# Patient Record
Sex: FEMALE | Race: WHITE
[De-identification: names, ages, dates, MRNs, and addresses within clinical notes are randomized per-mention and may not be internally consistent; named-entity substitution may affect disease eponyms.]

---

## 2018-05-26 ENCOUNTER — HOSPITAL ENCOUNTER (INPATIENT)
Dept: HOSPITAL 17 - NEPC | Age: 48
LOS: 6 days | Discharge: HOME | DRG: 195 | End: 2018-06-01
Payer: COMMERCIAL

## 2018-05-26 VITALS
RESPIRATION RATE: 18 BRPM | DIASTOLIC BLOOD PRESSURE: 67 MMHG | HEART RATE: 95 BPM | SYSTOLIC BLOOD PRESSURE: 126 MMHG | OXYGEN SATURATION: 95 %

## 2018-05-26 VITALS
DIASTOLIC BLOOD PRESSURE: 65 MMHG | OXYGEN SATURATION: 95 % | RESPIRATION RATE: 18 BRPM | SYSTOLIC BLOOD PRESSURE: 123 MMHG | HEART RATE: 95 BPM

## 2018-05-26 VITALS
DIASTOLIC BLOOD PRESSURE: 70 MMHG | HEART RATE: 100 BPM | RESPIRATION RATE: 18 BRPM | OXYGEN SATURATION: 96 % | TEMPERATURE: 98.6 F | SYSTOLIC BLOOD PRESSURE: 136 MMHG

## 2018-05-26 VITALS
HEART RATE: 96 BPM | SYSTOLIC BLOOD PRESSURE: 108 MMHG | OXYGEN SATURATION: 96 % | DIASTOLIC BLOOD PRESSURE: 54 MMHG | RESPIRATION RATE: 18 BRPM | TEMPERATURE: 99.8 F

## 2018-05-26 VITALS — BODY MASS INDEX: 25.97 KG/M2 | HEIGHT: 66 IN | WEIGHT: 161.6 LBS

## 2018-05-26 VITALS — OXYGEN SATURATION: 92 %

## 2018-05-26 DIAGNOSIS — Z92.3: ICD-10-CM

## 2018-05-26 DIAGNOSIS — J18.9: Primary | ICD-10-CM

## 2018-05-26 DIAGNOSIS — C50.919: ICD-10-CM

## 2018-05-26 DIAGNOSIS — D72.829: ICD-10-CM

## 2018-05-26 LAB
BASOPHILS # BLD AUTO: 0 TH/MM3 (ref 0–0.2)
BASOPHILS NFR BLD: 0.3 % (ref 0–2)
BUN SERPL-MCNC: 6 MG/DL (ref 7–18)
CALCIUM SERPL-MCNC: 8.6 MG/DL (ref 8.5–10.1)
CHLORIDE SERPL-SCNC: 97 MEQ/L (ref 98–107)
CREAT SERPL-MCNC: 0.6 MG/DL (ref 0.5–1)
EOSINOPHIL # BLD: 0.3 TH/MM3 (ref 0–0.4)
EOSINOPHIL NFR BLD: 2.2 % (ref 0–4)
ERYTHROCYTE [DISTWIDTH] IN BLOOD BY AUTOMATED COUNT: 13.2 % (ref 11.6–17.2)
GFR SERPLBLD BASED ON 1.73 SQ M-ARVRAT: 107 ML/MIN (ref 89–?)
GLUCOSE SERPL-MCNC: 87 MG/DL (ref 74–106)
HCO3 BLD-SCNC: 29.6 MEQ/L (ref 21–32)
HCT VFR BLD CALC: 30.9 % (ref 35–46)
HGB BLD-MCNC: 10.5 GM/DL (ref 11.6–15.3)
INR PPP: 1.1 RATIO
LYMPHOCYTES # BLD AUTO: 0.7 TH/MM3 (ref 1–4.8)
LYMPHOCYTES NFR BLD AUTO: 5.8 % (ref 9–44)
MAGNESIUM SERPL-MCNC: 2.2 MG/DL (ref 1.5–2.5)
MCH RBC QN AUTO: 31.6 PG (ref 27–34)
MCHC RBC AUTO-ENTMCNC: 33.9 % (ref 32–36)
MCV RBC AUTO: 93.3 FL (ref 80–100)
MONOCYTE #: 1 TH/MM3 (ref 0–0.9)
MONOCYTES NFR BLD: 8.4 % (ref 0–8)
NEUTROPHILS # BLD AUTO: 10.1 TH/MM3 (ref 1.8–7.7)
NEUTROPHILS NFR BLD AUTO: 83.3 % (ref 16–70)
PLATELET # BLD: 574 TH/MM3 (ref 150–450)
PMV BLD AUTO: 7.2 FL (ref 7–11)
PROTHROMBIN TIME: 10.9 SEC (ref 9.8–11.6)
RBC # BLD AUTO: 3.32 MIL/MM3 (ref 4–5.3)
SODIUM SERPL-SCNC: 136 MEQ/L (ref 136–145)
TROPONIN I SERPL-MCNC: (no result) NG/ML (ref 0.02–0.05)
WBC # BLD AUTO: 12.2 TH/MM3 (ref 4–11)

## 2018-05-26 PROCEDURE — 94667 MNPJ CHEST WALL 1ST: CPT

## 2018-05-26 PROCEDURE — 87116 MYCOBACTERIA CULTURE: CPT

## 2018-05-26 PROCEDURE — 87206 SMEAR FLUORESCENT/ACID STAI: CPT

## 2018-05-26 PROCEDURE — 87040 BLOOD CULTURE FOR BACTERIA: CPT

## 2018-05-26 PROCEDURE — 94664 DEMO&/EVAL PT USE INHALER: CPT

## 2018-05-26 PROCEDURE — 71046 X-RAY EXAM CHEST 2 VIEWS: CPT

## 2018-05-26 PROCEDURE — 84484 ASSAY OF TROPONIN QUANT: CPT

## 2018-05-26 PROCEDURE — 83880 ASSAY OF NATRIURETIC PEPTIDE: CPT

## 2018-05-26 PROCEDURE — 87449 NOS EACH ORGANISM AG IA: CPT

## 2018-05-26 PROCEDURE — 85610 PROTHROMBIN TIME: CPT

## 2018-05-26 PROCEDURE — 83735 ASSAY OF MAGNESIUM: CPT

## 2018-05-26 PROCEDURE — 83605 ASSAY OF LACTIC ACID: CPT

## 2018-05-26 PROCEDURE — 94150 VITAL CAPACITY TEST: CPT

## 2018-05-26 PROCEDURE — 87015 SPECIMEN INFECT AGNT CONCNTJ: CPT

## 2018-05-26 PROCEDURE — 87804 INFLUENZA ASSAY W/OPTIC: CPT

## 2018-05-26 PROCEDURE — 94668 MNPJ CHEST WALL SBSQ: CPT

## 2018-05-26 PROCEDURE — 94640 AIRWAY INHALATION TREATMENT: CPT

## 2018-05-26 PROCEDURE — 80048 BASIC METABOLIC PNL TOTAL CA: CPT

## 2018-05-26 PROCEDURE — 99285 EMERGENCY DEPT VISIT HI MDM: CPT

## 2018-05-26 PROCEDURE — 71045 X-RAY EXAM CHEST 1 VIEW: CPT

## 2018-05-26 PROCEDURE — 85730 THROMBOPLASTIN TIME PARTIAL: CPT

## 2018-05-26 PROCEDURE — 71260 CT THORAX DX C+: CPT

## 2018-05-26 PROCEDURE — 87205 SMEAR GRAM STAIN: CPT

## 2018-05-26 PROCEDURE — 87070 CULTURE OTHR SPECIMN AEROBIC: CPT

## 2018-05-26 PROCEDURE — 93005 ELECTROCARDIOGRAM TRACING: CPT

## 2018-05-26 PROCEDURE — 85025 COMPLETE CBC W/AUTO DIFF WBC: CPT

## 2018-05-26 RX ADMIN — TAZOBACTAM SODIUM AND PIPERACILLIN SODIUM SCH MLS/HR: 375; 3 INJECTION, SOLUTION INTRAVENOUS at 17:00

## 2018-05-26 RX ADMIN — TAZOBACTAM SODIUM AND PIPERACILLIN SODIUM SCH MLS/HR: 375; 3 INJECTION, SOLUTION INTRAVENOUS at 22:50

## 2018-05-26 RX ADMIN — IPRATROPIUM BROMIDE AND ALBUTEROL SULFATE SCH AMPULE: .5; 3 SOLUTION RESPIRATORY (INHALATION) at 20:09

## 2018-05-26 RX ADMIN — Medication SCH ML: at 21:20

## 2018-05-26 NOTE — PD
HPI


Chief Complaint:  Respiratory Symptoms


Time Seen by Provider:  14:34


Travel History


International Travel<30 days:  No


Contact w/Intl Traveler<30days:  No


Traveled to known affect area:  No





History of Present Illness


HPI


49yo F with PMH of breast CA s/p radiation therapy ending in April and now on 

tamoxifen here with c/o persistent fever and worsening sob.  Pt said she had a 

CXR last week that showed pneumonia and was place on azithromycin starting 3 

days ago by her primary care physician.  Feels more sob with lying down and 

walking.  Denies any chest pain but does have right breast pain. Denies any n/v

, abdominal pain, focal weakness or numbness.  Oncologist is Dr. Cano.





[facility]


Past Medical History


Pregnant?:  Not Pregnant





Social History


Tobacco Use:  No





Allergies-Medications


(Allergen,Severity, Reaction):  


Coded Allergies:  


     apple (Unverified  Allergy, Severe, 5/26/18)


 JUICE


     lidocaine (Unverified  Allergy, Severe, 5/26/18)


 NKA


Reported Meds & Prescriptions





Reported Meds & Active Scripts


Active


Reported


Tamoxifen (Tamoxifen Citrate) 10 Mg Tab 10 Mg PO DAILY








Review of Systems


Except as stated in HPI:  all other systems reviewed are Neg





Physical Exam


Narrative


GENERAL: 49yo F in mild distress.


SKIN: Focused skin assessment warm/dry.


HEAD: Atraumatic. Normocephalic. 


EYES: Pupils equal and round. No scleral icterus. No injection or drainage. 


ENT: No nasal bleeding or discharge.  Mucous membranes pink and moist.


NECK: Trachea midline. No JVD. 


CARDIOVASCULAR: Regular rate and rhythm.  No murmur appreciated.


RESPIRATORY: No accessory muscle use. Coarse breath sounds in right lower lung.


GASTROINTESTINAL: Abdomen soft, non-tender, nondistended. 


MUSCULOSKELETAL: No obvious deformities. No clubbing.  No cyanosis.  No edema. 


NEUROLOGICAL: Awake and alert. No obvious cranial nerve deficits.  Motor 

grossly within normal limits. Normal speech.


PSYCHIATRIC: Appropriate mood and affect; insight and judgment normal.





Data


Data


Last Documented VS





Vital Signs








  Date Time  Temp Pulse Resp B/P (MAP) Pulse Ox O2 Delivery O2 Flow Rate FiO2


 


5/26/18 16:47  95 18 123/65 (84) 95 Room Air  


 


5/26/18 14:24 98.6       








Orders





 Orders


Complete Blood Count With Diff (5/26/18 14:43)


Basic Metabolic Panel (Bmp) (5/26/18 14:43)


B-Type Natriuretic Peptide (5/26/18 14:43)


Act Partial Throm Time (Ptt) (5/26/18 14:43)


Prothrombin Time / Inr (Pt) (5/26/18 14:43)


Magnesium (Mg) (5/26/18 14:43)


Troponin I (5/26/18 14:43)


Influenzae A/B Antigen (5/26/18 14:43)


Blood Culture (5/26/18 14:43)


Electrocardiogram (5/26/18 14:43)


Chest, Single Ap (5/26/18 14:43)


Lactic Acid Sepsis Protocol (5/26/18 14:43)


Levofloxacin 750 Mg Premix Inj (Levaquin (5/26/18 16:15)


Admit Order (Ed Use Only) (5/26/18 16:32)


Sodium Chloride 0.9% Flush (Ns Flush) (5/26/18 16:45)


Sodium Chloride 0.9% Flush (Ns Flush) (5/26/18 21:00)


Ondansetron Inj (Zofran Inj) (5/26/18 16:45)


Admit To Inpatient (5/26/18 )


Code Status (5/26/18 16:32)


Vital Signs (Adult) Q4H (5/26/18 16:32)


Activity Oob With Assistance PRN (5/26/18 16:32)


Notify  Parameters (5/26/18 16:32)


Intake + Output Q8H (5/26/18 16:32)


^ Smoking Cessation Counseling (5/26/18 16:32)


Diet Regular Basic (5/26/18 Dinner)


Complete Blood Count With Diff (5/27/18 06:00)


Basic Metabolic Panel (Bmp) (5/27/18 06:00)


Chest, Pa & Lat (5/27/18 08:00)


Consult Pt Eval & Treat (5/26/18 16:32)


Scd Bilateral/Knee High JOI.BID (5/26/18 16:32)


Inpatient Certification (5/26/18 )


Piperacil-Tazo 3.375 Gm Premix (Zosyn 3. (5/26/18 17:00)


Albuterol-Ipratropium Neb (Duoneb Neb) (5/26/18 20:00)


Albuterol-Ipratropium Neb (Duoneb Neb) (5/26/18 16:45)


Tamoxifen (Nolvadex) (5/27/18 09:00)





Labs





Laboratory Tests








Test


  5/26/18


14:50


 


White Blood Count 12.2 TH/MM3 


 


Red Blood Count 3.32 MIL/MM3 


 


Hemoglobin 10.5 GM/DL 


 


Hematocrit 30.9 % 


 


Mean Corpuscular Volume 93.3 FL 


 


Mean Corpuscular Hemoglobin 31.6 PG 


 


Mean Corpuscular Hemoglobin


Concent 33.9 % 


 


 


Red Cell Distribution Width 13.2 % 


 


Platelet Count 574 TH/MM3 


 


Mean Platelet Volume 7.2 FL 


 


Neutrophils (%) (Auto) 83.3 % 


 


Lymphocytes (%) (Auto) 5.8 % 


 


Monocytes (%) (Auto) 8.4 % 


 


Eosinophils (%) (Auto) 2.2 % 


 


Basophils (%) (Auto) 0.3 % 


 


Neutrophils # (Auto) 10.1 TH/MM3 


 


Lymphocytes # (Auto) 0.7 TH/MM3 


 


Monocytes # (Auto) 1.0 TH/MM3 


 


Eosinophils # (Auto) 0.3 TH/MM3 


 


Basophils # (Auto) 0.0 TH/MM3 


 


CBC Comment DIFF FINAL 


 


Differential Comment  


 


Prothrombin Time 10.9 SEC 


 


Prothromb Time International


Ratio 1.1 RATIO 


 


 


Activated Partial


Thromboplast Time 26.2 SEC 


 


 


Blood Urea Nitrogen 6 MG/DL 


 


Creatinine 0.60 MG/DL 


 


Random Glucose 87 MG/DL 


 


Calcium Level 8.6 MG/DL 


 


Magnesium Level 2.2 MG/DL 


 


Sodium Level 136 MEQ/L 


 


Potassium Level 3.7 MEQ/L 


 


Chloride Level 97 MEQ/L 


 


Carbon Dioxide Level 29.6 MEQ/L 


 


Anion Gap 9 MEQ/L 


 


Estimat Glomerular Filtration


Rate 107 ML/MIN 


 


 


Lactic Acid Level 0.8 mmol/L 


 


Troponin I


  LESS THAN 0.02


NG/ML


 


B-Type Natriuretic Peptide 68 PG/ML 











MDM


Medical Decision Making


Medical Screen Exam Complete:  Yes


Emergency Medical Condition:  Yes


Interpretation(s)


EKG: NSR 90bpm.  Normal axis.  No ST segment elevation or depression.


Differential Diagnosis


Pneumonia vs. pleural effusion vs. malignancy


Narrative Course


49yo F with breast cancer on tamoxifen here with fever, cough, sob that has 

been worsening in the last few days.  Pt has been on azithromycin but states it 

is getting worst.  Physical exam showed decreased breath sounds and coarse 

breath sound in right lower lung.  Labs reviewed, WBC 12.2.  H/H low at 10.5/

30.9.  However, her previous H/H was from 2005.  Elevated platelet count.  

Lactic acid normal.  BNP normal.  Troponin negative.  BMP unremarkable.  CXR 

showed right lower lobe pneumonia.  I have reviewed the CXR myself and felt 

that there is a large infiltrate that covers right lower and middle lobe and 

since pt is worsening with outpatient treatment, will admit pt for IV 

antibiotics.  Pt given levofloxacin IV.  Discussed with Dr. Dockery and 

accepted to his service.





Diagnosis





 Primary Impression:  


 Pneumonia


 Qualified Codes:  J18.1 - Lobar pneumonia, unspecified organism





Admitting Information


Admitting Physician Requests:  Mabel Meng DO May 26, 2018 14:46

## 2018-05-26 NOTE — RADRPT
EXAM DATE:  5/26/2018 3:16 PM EDT

AGE/SEX:        48 years / Female



INDICATIONS:  SOB. Fever one week. 



CLINICAL DATA:  This is the patient's initial encounter. Patient reports that signs and symptoms have
 been present for 1 week and indicates a pain score of 0/10. 

                                                                          

MEDICAL/SURGICAL HISTORY:       None. None.



COMPARISON:         None.



FINDINGS:  

A single AP view of the chest demonstrates a dense consolidation involving the right midlung and righ
t lower lobe. This obscures the hemidiaphragm. Left lung is clear. No discernible effusions. Heart is
 normal in size. Bony structures are unremarkable.





CONCLUSION: 

   Right lower lobe pneumonia.



Electronically signed by: Sal Hartman MD  5/26/2018 3:23 PM EDT

## 2018-05-26 NOTE — HHI.HP
HPI


Service


[facility]


Primary Care Physician


Non-Staff


Admission Diagnosis





Right lower pneumonia


Chief Complaint:  


cough and SOB


Travel History


International Travel<30 Days:  No


Contact w/Intl Traveler <30 Da:  No


Traveled to Known Affected Are:  No


History of Present Illness


This a 49yo female patient with PMH of breast CA s/p resection 2017 and 

radiation therapy ending in early April now on tamoxifen.  Pt reports that she 

had a CXR last week that showed pneumonia and was place on azithromycin which 

was starting 3 days ago by her primary care physician.  Patient reports that 

she continues to have fevers 101 -102 with chills, SOB with minimal exertion 

and cough productive of yellow phlegm.  Denies any chest pain but does have 

right breast pain. Patient denies any n/v, abdominal pain, focal weakness or 

numbness.  Patient's radiation oncologist is Dr. Stinson.  





CXR reveals right lower lobe pneumonia





Review of Systems


Constitutional:  COMPLAINS OF: Fever, Chills


Respiratory:  COMPLAINS OF: Cough, Sputum production, Shortness of breath





Past Family Social History


Past Medical History


breast CA s/p resection and radiation therapy ending in early April now on 

tamoxifen


Past Surgical History


Resection right breast Ca 2017


cholecystectomy


tubal ligation


R shoulder orthoscopic surgery


Reported Medications





Tamoxifen (Tamoxifen Citrate) 10 Mg Tab 10 Mg PO DAILY


Azithromycin started 3 days ago


Allergies:  


Coded Allergies:  


     apple (Unverified  Allergy, Severe, 18)


 JUICE


     lidocaine (Unverified  Allergy, Severe, 18)


 NKA


Family History


noncontributory


Social History


ETOH use: 2-3 beers per day


denies tobacco use or illicit drug use





Physical Exam


Vital Signs





Vital Signs








  Date Time  Temp Pulse Resp B/P (MAP) Pulse Ox O2 Delivery O2 Flow Rate FiO2


 


18 16:47  95 18 123/65 (84) 95 Room Air  


 


18 15:20  96 18  98 Room Air  


 


18 15:20  95 18 126/67 (86) 95 Room Air  


 


18 14:24 98.6 100 18 136/70 (92) 96   








Physical Exam


GENERAL: This is a well-nourished, well-developed patient, in no apparent 

distress.


SKIN: No rashes, ecchymoses or lesions. Cool and dry.


HEAD: Atraumatic. Normocephalic. No temporal or scalp tenderness.


EYES: Extraocular motions intact. No scleral icterus. No injection or drainage. 


CARDIOVASCULAR: Regular rate and rhythm


RESPIRATORY: left lung fields clear to auscultation.  Right lung fields coarse 

rhonci with expiratory wheezes and crackles


GASTROINTESTINAL: Abdomen soft, non-tender, nondistended. 


MUSCULOSKELETAL: Extremities without clubbing, cyanosis, or edema. No joint 

tenderness, effusion, or edema noted. No calf tenderness. Negative Homans sign 

bilaterally.


NEUROLOGICAL: Awake and alert. Cranial nerves II through XII intact.  Motor and 

sensory grossly within normal limits. Five out of 5 muscle strength in all 

muscle groups.  Normal speech.


Laboratory





Laboratory Tests








Test


  18


14:50


 


White Blood Count 12.2 


 


Red Blood Count 3.32 


 


Hemoglobin 10.5 


 


Hematocrit 30.9 


 


Mean Corpuscular Volume 93.3 


 


Mean Corpuscular Hemoglobin 31.6 


 


Mean Corpuscular Hemoglobin


Concent 33.9 


 


 


Red Cell Distribution Width 13.2 


 


Platelet Count 574 


 


Mean Platelet Volume 7.2 


 


Neutrophils (%) (Auto) 83.3 


 


Lymphocytes (%) (Auto) 5.8 


 


Monocytes (%) (Auto) 8.4 


 


Eosinophils (%) (Auto) 2.2 


 


Basophils (%) (Auto) 0.3 


 


Neutrophils # (Auto) 10.1 


 


Lymphocytes # (Auto) 0.7 


 


Monocytes # (Auto) 1.0 


 


Eosinophils # (Auto) 0.3 


 


Basophils # (Auto) 0.0 


 


CBC Comment DIFF FINAL 


 


Differential Comment  


 


Prothrombin Time 10.9 


 


Prothromb Time International


Ratio 1.1 


 


 


Activated Partial


Thromboplast Time 26.2 


 


 


Blood Urea Nitrogen 6 


 


Creatinine 0.60 


 


Random Glucose 87 


 


Calcium Level 8.6 


 


Magnesium Level 2.2 


 


Sodium Level 136 


 


Potassium Level 3.7 


 


Chloride Level 97 


 


Carbon Dioxide Level 29.6 


 


Anion Gap 9 


 


Estimat Glomerular Filtration


Rate 107 


 


 


Lactic Acid Level 0.8 


 


Troponin I LESS THAN 0.02 


 


B-Type Natriuretic Peptide 68 














 Date/Time


Source Procedure


Growth Status


 


 


 18 14:55


Blood Peripheral Aerobic Blood Culture


Pending Received


 


 18 14:55


Blood Peripheral Anaerobic Blood Culture


Pending Received


 


 18 15:16


Nasal Aspirate Influenza Types A,B Antigen (DOMINIQUE) - Final


NEGATIVE FOR FLU A AND B ANTIGEN.... Complete








Result Diagram:  


18 1450                                                                   

             18 1450





Imaging





Last Impressions








Chest X-Ray 18 1443 Signed





Impressions: 





 CONCLUSION: 





    Right lower lobe pneumonia.





  





 











Caprini VTE Risk Assessment


Caprini VTE Risk Assessment:  No/Low Risk (score <= 1)


Caprini Risk Assessment Model











 Point Value = 1          Point Value = 2  Point Value = 3  Point Value = 5


 


Age 41-60


Minor surgery


BMI > 25 kg/m2


Swollen legs


Varicose veins


Pregnancy or postpartum


History of unexplained or recurrent


   spontaneous 


Oral contraceptives or hormone


   replacement


Sepsis (< 1 month)


Serious lung disease, including


   pneumonia (< 1 month)


Abnormal pulmonary function


Acute myocardial infarction


Congestive heart failure (< 1 month)


History of inflammatory bowel disease


Medical patient at bed rest Age 61-74


Arthroscopic surgery


Major open surgery (> 45 min)


Laparoscopic surgery (> 45 min)


Malignancy


Confined to bed (> 72 hours)


Immobilizing plaster cast


Central venous access Age >= 75


History of VTE


Family history of VTE


Factor V Leiden


Prothrombin 88556E


Lupus anticoagulant


Anticardiolipin antibodies


Elevated serum homocysteine


Heparin-induced thrombocytopenia


Other congenital or acquired


   thrombophilia Stroke (< 1 month)


Elective arthroplasty


Hip, pelvis, or leg fracture


Acute spinal cord injury (< 1 month)








Prophylaxis Regimen











   Total Risk


Factor Score Risk Level Prophylaxis Regimen


 


0-1      Low Early ambulation


 


2 Moderate Order ONE of the following:


*Sequential Compression Device (SCD)


*Heparin 5000 units SQ BID


 


3-4 Higher Order ONE of the following medications:


*Heparin 5000 units SQ TID


*Enoxaparin/Lovenox 40 mg SQ daily (WT < 150 kg, CrCl > 30 mL/min)


*Enoxaparin/Lovenox 30 mg SQ daily (WT < 150 kg, CrCl > 10-29 mL/min)


*Enoxaparin/Lovenox 30 mg SQ BID (WT < 150 kg, CrCl > 30 mL/min)


AND/OR


*Sequential Compression Device (SCD)


 


5 or more Highest Order ONE of the following medications:


*Heparin 5000 units SQ TID (Preferred with Epidurals)


*Enoxaparin/Lovenox 40 mg SQ daily (WT < 150 kg, CrCl > 30 mL/min)


*Enoxaparin/Lovenox 30 mg SQ daily (WT < 150 kg, CrCl > 10-29 mL/min)


*Enoxaparin/Lovenox 30 mg SQ BID (WT < 150 kg, CrCl > 30 mL/min)


AND


*Sequential Compression Device (SCD)











Assessment and Plan


Problem List:  


(1) Pneumonia


ICD Codes:  J18.9 - Pneumonia, unspecified organism


Status:  Acute


Plan:  This a 49yo female patient with PMH of breast CA s/p resection 2017 

and radiation therapy ending in early April now on tamoxifen.  Pt reports that 

she had a CXR last week that showed pneumonia and was place on azithromycin 

which was starting 3 days ago by her primary care physician.  Patient reports 

that she continues to have fevers 101 -102 while chills, SOB with minimal 

exertion and cough productive of yellow phlegm.  Denies any chest pain but does 

have right breast pain. Patient denies any n/v, abdominal pain, focal weakness 

or numbness.  Patient's radiation oncologist is Dr. Stinson.  





- CXR reveals right lower lobe pneumonia


- started Zosyn


- repeat CXR in AM


- duonebs Q6H while awake and PRN





DVT prophylaxis with SCDs





(2) Breast CA


ICD Codes:  C50.919 - Malignant neoplasm of unspecified site of unspecified 

female breast


Plan:  This a 49yo female patient with PMH of breast CA s/p resection 2017 

and radiation therapy ending in early April now on tamoxifen.  Patient's 

radiation oncologist is Dr. Stinson.  





- continue patient's home tamoxifen





Assessment and Plan


Patient examined.


Assessment and plan formulated with Gracie Costa PA-C.


I agree with the above.





Physician Certification


2 Midnight Certification Type:  Admission for Inpatient Services


Order for Inpatient Services


The services are ordered in accordance with Medicare regulations or non-

Medicare payer requirements, as applicable.  In the case of services not 

specified as inpatient-only, they are appropriately provided as inpatient 

services in accordance with the 2-midnight benchmark.


Estimated LOS (days):  3


 days is the estimated time the patient will need to remain in the hospital, 

assuming treatment plan goals are met and no additional complications.


Post-Hospital Plan:  Home





Problem Qualifiers





(1) Pneumonia:  


Qualified Codes:  J18.1 - Lobar pneumonia, unspecified organism








Gracie Costa May 26, 2018 16:49


Vimal Dockery DO May 27, 2018 23:31

## 2018-05-27 VITALS
TEMPERATURE: 98.5 F | SYSTOLIC BLOOD PRESSURE: 121 MMHG | HEART RATE: 98 BPM | RESPIRATION RATE: 21 BRPM | DIASTOLIC BLOOD PRESSURE: 67 MMHG | OXYGEN SATURATION: 95 %

## 2018-05-27 VITALS — OXYGEN SATURATION: 94 %

## 2018-05-27 VITALS
SYSTOLIC BLOOD PRESSURE: 115 MMHG | HEART RATE: 95 BPM | TEMPERATURE: 100.8 F | RESPIRATION RATE: 18 BRPM | OXYGEN SATURATION: 95 % | DIASTOLIC BLOOD PRESSURE: 57 MMHG

## 2018-05-27 VITALS
OXYGEN SATURATION: 94 % | HEART RATE: 86 BPM | SYSTOLIC BLOOD PRESSURE: 109 MMHG | RESPIRATION RATE: 18 BRPM | TEMPERATURE: 98.5 F | DIASTOLIC BLOOD PRESSURE: 56 MMHG

## 2018-05-27 VITALS
DIASTOLIC BLOOD PRESSURE: 51 MMHG | HEART RATE: 93 BPM | SYSTOLIC BLOOD PRESSURE: 98 MMHG | OXYGEN SATURATION: 95 % | RESPIRATION RATE: 20 BRPM | TEMPERATURE: 99 F

## 2018-05-27 VITALS
OXYGEN SATURATION: 93 % | DIASTOLIC BLOOD PRESSURE: 57 MMHG | HEART RATE: 96 BPM | TEMPERATURE: 101 F | SYSTOLIC BLOOD PRESSURE: 109 MMHG | RESPIRATION RATE: 20 BRPM

## 2018-05-27 VITALS
TEMPERATURE: 98.5 F | OXYGEN SATURATION: 95 % | SYSTOLIC BLOOD PRESSURE: 112 MMHG | DIASTOLIC BLOOD PRESSURE: 56 MMHG | HEART RATE: 85 BPM | RESPIRATION RATE: 19 BRPM

## 2018-05-27 VITALS — TEMPERATURE: 101.7 F

## 2018-05-27 VITALS — TEMPERATURE: 99.7 F

## 2018-05-27 LAB
BASOPHILS # BLD AUTO: 0.1 TH/MM3 (ref 0–0.2)
BASOPHILS NFR BLD: 0.5 % (ref 0–2)
BUN SERPL-MCNC: 6 MG/DL (ref 7–18)
CALCIUM SERPL-MCNC: 8.5 MG/DL (ref 8.5–10.1)
CHLORIDE SERPL-SCNC: 99 MEQ/L (ref 98–107)
CREAT SERPL-MCNC: 0.63 MG/DL (ref 0.5–1)
EOSINOPHIL # BLD: 0.3 TH/MM3 (ref 0–0.4)
EOSINOPHIL NFR BLD: 2.6 % (ref 0–4)
ERYTHROCYTE [DISTWIDTH] IN BLOOD BY AUTOMATED COUNT: 13.7 % (ref 11.6–17.2)
GFR SERPLBLD BASED ON 1.73 SQ M-ARVRAT: 101 ML/MIN (ref 89–?)
GLUCOSE SERPL-MCNC: 91 MG/DL (ref 74–106)
HCO3 BLD-SCNC: 26.8 MEQ/L (ref 21–32)
HCT VFR BLD CALC: 30.7 % (ref 35–46)
HGB BLD-MCNC: 10.3 GM/DL (ref 11.6–15.3)
LYMPHOCYTES # BLD AUTO: 0.6 TH/MM3 (ref 1–4.8)
LYMPHOCYTES NFR BLD AUTO: 4.3 % (ref 9–44)
MCH RBC QN AUTO: 31.4 PG (ref 27–34)
MCHC RBC AUTO-ENTMCNC: 33.7 % (ref 32–36)
MCV RBC AUTO: 93.3 FL (ref 80–100)
MONOCYTE #: 1 TH/MM3 (ref 0–0.9)
MONOCYTES NFR BLD: 7.6 % (ref 0–8)
NEUTROPHILS # BLD AUTO: 11.3 TH/MM3 (ref 1.8–7.7)
NEUTROPHILS NFR BLD AUTO: 85 % (ref 16–70)
PLATELET # BLD: 548 TH/MM3 (ref 150–450)
PMV BLD AUTO: 7.6 FL (ref 7–11)
RBC # BLD AUTO: 3.29 MIL/MM3 (ref 4–5.3)
SODIUM SERPL-SCNC: 136 MEQ/L (ref 136–145)
WBC # BLD AUTO: 13.3 TH/MM3 (ref 4–11)

## 2018-05-27 RX ADMIN — IPRATROPIUM BROMIDE AND ALBUTEROL SULFATE SCH AMPULE: .5; 3 SOLUTION RESPIRATORY (INHALATION) at 13:55

## 2018-05-27 RX ADMIN — TAZOBACTAM SODIUM AND PIPERACILLIN SODIUM SCH MLS/HR: 375; 3 INJECTION, SOLUTION INTRAVENOUS at 23:03

## 2018-05-27 RX ADMIN — IPRATROPIUM BROMIDE AND ALBUTEROL SULFATE SCH AMPULE: .5; 3 SOLUTION RESPIRATORY (INHALATION) at 08:51

## 2018-05-27 RX ADMIN — TAZOBACTAM SODIUM AND PIPERACILLIN SODIUM SCH MLS/HR: 375; 3 INJECTION, SOLUTION INTRAVENOUS at 04:08

## 2018-05-27 RX ADMIN — ACETAMINOPHEN PRN MG: 325 TABLET ORAL at 04:09

## 2018-05-27 RX ADMIN — TAZOBACTAM SODIUM AND PIPERACILLIN SODIUM SCH MLS/HR: 375; 3 INJECTION, SOLUTION INTRAVENOUS at 12:00

## 2018-05-27 RX ADMIN — IPRATROPIUM BROMIDE AND ALBUTEROL SULFATE SCH AMPULE: .5; 3 SOLUTION RESPIRATORY (INHALATION) at 20:25

## 2018-05-27 RX ADMIN — TAMOXIFEN CITRATE SCH MG: 10 TABLET, FILM COATED ORAL at 12:00

## 2018-05-27 RX ADMIN — Medication SCH ML: at 09:00

## 2018-05-27 RX ADMIN — Medication SCH ML: at 20:32

## 2018-05-27 RX ADMIN — ACETAMINOPHEN PRN MG: 325 TABLET ORAL at 14:22

## 2018-05-27 RX ADMIN — TAMOXIFEN CITRATE SCH MG: 10 TABLET, FILM COATED ORAL at 08:55

## 2018-05-27 RX ADMIN — TAZOBACTAM SODIUM AND PIPERACILLIN SODIUM SCH MLS/HR: 375; 3 INJECTION, SOLUTION INTRAVENOUS at 17:52

## 2018-05-27 NOTE — RADRPT
EXAM DATE:  5/27/2018 7:55 AM EDT

AGE/SEX:        48 years / Female



INDICATIONS:  Right lower lobe pneumonia.



CLINICAL DATA:  This is the patient's subsequent encounter. Patient reports that signs and symptoms h
ave been present for 1 week and indicates a pain score of 0/10. 

                                                                          

MEDICAL/SURGICAL HISTORY:       None. None.



[place]      [facility] [place].  .



FINDINGS:  

There continues to be diffuse interstitial and airspace infiltrate throughout most of the right lung.
 This is stable compared to the prior exam. The left lung remains clear and well aerated. No new pare
nchymal infiltrates are demonstrated. The heart size is stable. The bony structures are stable.



CONCLUSION: 

No significant change in the diffuse patchy infiltrate throughout the right lung compared to the prio
r study. The findings would suggest right pneumonia.



Electronically signed by: Buzz Palacio MD  5/27/2018 7:59 AM EDT

## 2018-05-27 NOTE — EKG
Date Performed: 05/26/2018       Time Performed: 14:43:53

 

PTAGE:      48 years

 

EKG:      Sinus rhythm 

 

 WITHIN NORMAL LIMITS BORDERLINE ECG 

 

NO PREVIOUS TRACING            

 

DOCTOR:   Leonardo Lassiter  Interpretating Date/Time  05/27/2018 14:26:04

## 2018-05-27 NOTE — HHI.PR
Subjective


Remarks


Patient reports feeling better today, less SOB





Objective


Vitals





Vital Signs








  Date Time  Temp Pulse Resp B/P (MAP) Pulse Ox O2 Delivery O2 Flow Rate FiO2


 


5/27/18 14:00 101.7       


 


5/27/18 12:00 98.5 98 21 121/67 (85) 95   


 


5/27/18 08:53     94   21


 


5/27/18 08:00 98.5 85 19 112/56 (74) 95   


 


5/27/18 08:00     95 Room Air  


 


5/27/18 04:44   18     


 


5/27/18 04:00 101.0 96 20 109/57 (74) 93   


 


5/27/18 00:39 100.8 95 18 115/57 (76) 95   


 


5/26/18 23:36      Room Air  


 


5/26/18 20:36 99.8 96 18 108/54 (72) 96   


 


5/26/18 20:09     92   21


 


5/26/18 16:47  95 18 123/65 (84) 95 Room Air  














 5/27/18 5/27/18 5/28/18





 15:00 23:00 07:00


 


Intake Total 170 ml  


 


Balance 170 ml  


 


   


 


Intake Oral 120 ml  


 


IV Total 50 ml  








Result Diagram:  


5/27/18 0433                                                                   

             5/27/18 0433





Other Results





 Laboratory Tests








Test


  5/26/18


14:50 5/27/18


04:33 5/27/18


06:26


 


White Blood Count 12.2 TH/MM3  13.3 TH/MM3  


 


Red Blood Count 3.32 MIL/MM3  3.29 MIL/MM3  


 


Hemoglobin 10.5 GM/DL  10.3 GM/DL  


 


Hematocrit 30.9 %  30.7 %  


 


Mean Corpuscular Volume 93.3 FL  93.3 FL  


 


Mean Corpuscular Hemoglobin 31.6 PG  31.4 PG  


 


Mean Corpuscular Hemoglobin


Concent 33.9 % 


  33.7 % 


  


 


 


Red Cell Distribution Width 13.2 %  13.7 %  


 


Platelet Count 574 TH/MM3  548 TH/MM3  


 


Mean Platelet Volume 7.2 FL  7.6 FL  


 


Neutrophils (%) (Auto) 83.3 %  85.0 %  


 


Lymphocytes (%) (Auto) 5.8 %  4.3 %  


 


Monocytes (%) (Auto) 8.4 %  7.6 %  


 


Eosinophils (%) (Auto) 2.2 %  2.6 %  


 


Basophils (%) (Auto) 0.3 %  0.5 %  


 


Neutrophils # (Auto) 10.1 TH/MM3  11.3 TH/MM3  


 


Lymphocytes # (Auto) 0.7 TH/MM3  0.6 TH/MM3  


 


Monocytes # (Auto) 1.0 TH/MM3  1.0 TH/MM3  


 


Eosinophils # (Auto) 0.3 TH/MM3  0.3 TH/MM3  


 


Basophils # (Auto) 0.0 TH/MM3  0.1 TH/MM3  


 


CBC Comment DIFF FINAL  DIFF FINAL  


 


Differential Comment     


 


Prothrombin Time 10.9 SEC   


 


Prothromb Time International


Ratio 1.1 RATIO 


  


  


 


 


Activated Partial


Thromboplast Time 26.2 SEC 


  


  


 


 


Blood Urea Nitrogen 6 MG/DL  6 MG/DL  


 


Creatinine 0.60 MG/DL  0.63 MG/DL  


 


Random Glucose 87 MG/DL  91 MG/DL  


 


Calcium Level 8.6 MG/DL  8.5 MG/DL  


 


Magnesium Level 2.2 MG/DL   


 


Sodium Level 136 MEQ/L  136 MEQ/L  


 


Potassium Level 3.7 MEQ/L  3.9 MEQ/L  


 


Chloride Level 97 MEQ/L  99 MEQ/L  


 


Carbon Dioxide Level 29.6 MEQ/L  26.8 MEQ/L  


 


Anion Gap 9 MEQ/L  10 MEQ/L  


 


Estimat Glomerular Filtration


Rate 107 ML/MIN 


  101 ML/MIN 


  


 


 


Lactic Acid Level 0.8 mmol/L   1.0 mmol/L 


 


Troponin I


  LESS THAN 0.02


NG/ML 


  


 


 


B-Type Natriuretic Peptide 68 PG/ML   








Imaging





Last Impressions








Chest X-Ray 5/26/18 1443 Signed





Impressions: 





 CONCLUSION: 





    Right lower lobe pneumonia.





  





 








Objective Remarks


GENERAL: This is a well-nourished, well-developed patient, in no apparent 

distress.


CARDIOVASCULAR: Regular rate and rhythm 


RESPIRATORY: diminished RLL with coarse crackles


GASTROINTESTINAL: Abdomen soft, non-tender, nondistended. Normal active bowel 

sounds


MUSCULOSKELETAL: Extremities without clubbing, cyanosis, or edema.


NEURO:  Alert & Oriented x4 to person, place, time, situation.  Moves all ext x4





A/P


Problem List:  


(1) Pneumonia


ICD Codes:  J18.9 - Pneumonia, unspecified organism


Status:  Acute


Plan:  This a 47yo female patient with PMH of breast CA s/p resection 11/2017 

and radiation therapy ending in early April now on tamoxifen.  Pt reports that 

she had a CXR last week that showed pneumonia and was place on azithromycin 

which was starting 3 days ago by her primary care physician.  Patient reports 

that she continues to have fevers 101 -102 while chills, SOB with minimal 

exertion and cough productive of yellow phlegm.  Denies any chest pain but does 

have right breast pain. Patient denies any n/v, abdominal pain, focal weakness 

or numbness.  Patient's radiation oncologist is Dr. Stinson.  





- CXR reveals right lower lobe pneumonia


- continue Zosyn


- repeat CXR (5/27) No significant change in the diffuse patchy infiltrate 

throughout the right lung compared to the prior study.  The findings would 

suggest right pneumonia.


- duonebs Q6H while awake and PRN


- repeat CBC in AM


-repeat CXR in AM


- add IS





DVT prophylaxis with SCDs





(2) Breast CA


ICD Codes:  C50.919 - Malignant neoplasm of unspecified site of unspecified 

female breast


Plan:  This a 47yo female patient with PMH of breast CA s/p resection 11/2017 

and radiation therapy ending in early April now on tamoxifen.  Patient's 

radiation oncologist is Dr. Stinson.  





- continue patient's home tamoxifen





Assessment and Plan


Patient examined.


Assessment and plan formulated with Gracie Costa PA-C.


I agree with the above.





Obtain Ct chest with contrast. 


continue IV zosyn





Problem Qualifiers





(1) Pneumonia:  


Qualified Codes:  J18.1 - Lobar pneumonia, unspecified organism








Gracie Costa May 27, 2018 15:44


Vimal Dockery DO May 27, 2018 23:34

## 2018-05-28 VITALS
TEMPERATURE: 97.8 F | RESPIRATION RATE: 18 BRPM | HEART RATE: 97 BPM | DIASTOLIC BLOOD PRESSURE: 55 MMHG | OXYGEN SATURATION: 95 % | SYSTOLIC BLOOD PRESSURE: 115 MMHG

## 2018-05-28 VITALS
OXYGEN SATURATION: 94 % | RESPIRATION RATE: 20 BRPM | DIASTOLIC BLOOD PRESSURE: 57 MMHG | HEART RATE: 99 BPM | SYSTOLIC BLOOD PRESSURE: 114 MMHG | TEMPERATURE: 100.6 F

## 2018-05-28 VITALS
DIASTOLIC BLOOD PRESSURE: 54 MMHG | HEART RATE: 93 BPM | SYSTOLIC BLOOD PRESSURE: 112 MMHG | TEMPERATURE: 98.5 F | OXYGEN SATURATION: 95 % | RESPIRATION RATE: 18 BRPM

## 2018-05-28 VITALS
OXYGEN SATURATION: 94 % | DIASTOLIC BLOOD PRESSURE: 56 MMHG | RESPIRATION RATE: 17 BRPM | HEART RATE: 101 BPM | TEMPERATURE: 98.1 F | SYSTOLIC BLOOD PRESSURE: 112 MMHG

## 2018-05-28 VITALS
DIASTOLIC BLOOD PRESSURE: 57 MMHG | RESPIRATION RATE: 18 BRPM | HEART RATE: 90 BPM | TEMPERATURE: 99.4 F | OXYGEN SATURATION: 95 % | SYSTOLIC BLOOD PRESSURE: 106 MMHG

## 2018-05-28 VITALS — OXYGEN SATURATION: 93 %

## 2018-05-28 VITALS
HEART RATE: 73 BPM | OXYGEN SATURATION: 95 % | RESPIRATION RATE: 16 BRPM | DIASTOLIC BLOOD PRESSURE: 57 MMHG | TEMPERATURE: 97.7 F | SYSTOLIC BLOOD PRESSURE: 107 MMHG

## 2018-05-28 VITALS — OXYGEN SATURATION: 94 %

## 2018-05-28 LAB
BASOPHILS # BLD AUTO: 0.1 TH/MM3 (ref 0–0.2)
BASOPHILS NFR BLD: 0.6 % (ref 0–2)
EOSINOPHIL # BLD: 0.4 TH/MM3 (ref 0–0.4)
EOSINOPHIL NFR BLD: 3.1 % (ref 0–4)
ERYTHROCYTE [DISTWIDTH] IN BLOOD BY AUTOMATED COUNT: 13.4 % (ref 11.6–17.2)
HCT VFR BLD CALC: 31.4 % (ref 35–46)
HGB BLD-MCNC: 10.5 GM/DL (ref 11.6–15.3)
LYMPHOCYTES # BLD AUTO: 0.8 TH/MM3 (ref 1–4.8)
LYMPHOCYTES NFR BLD AUTO: 6.1 % (ref 9–44)
MCH RBC QN AUTO: 31.4 PG (ref 27–34)
MCHC RBC AUTO-ENTMCNC: 33.5 % (ref 32–36)
MCV RBC AUTO: 93.8 FL (ref 80–100)
MONOCYTE #: 0.8 TH/MM3 (ref 0–0.9)
MONOCYTES NFR BLD: 5.6 % (ref 0–8)
NEUTROPHILS # BLD AUTO: 11.6 TH/MM3 (ref 1.8–7.7)
NEUTROPHILS NFR BLD AUTO: 84.6 % (ref 16–70)
PLATELET # BLD: 561 TH/MM3 (ref 150–450)
PMV BLD AUTO: 7.7 FL (ref 7–11)
RBC # BLD AUTO: 3.35 MIL/MM3 (ref 4–5.3)
WBC # BLD AUTO: 13.7 TH/MM3 (ref 4–11)

## 2018-05-28 RX ADMIN — TAZOBACTAM SODIUM AND PIPERACILLIN SODIUM SCH MLS/HR: 375; 3 INJECTION, SOLUTION INTRAVENOUS at 05:42

## 2018-05-28 RX ADMIN — ACETAMINOPHEN PRN MG: 325 TABLET ORAL at 00:36

## 2018-05-28 RX ADMIN — TAZOBACTAM SODIUM AND PIPERACILLIN SODIUM SCH MLS/HR: 375; 3 INJECTION, SOLUTION INTRAVENOUS at 10:17

## 2018-05-28 RX ADMIN — IPRATROPIUM BROMIDE AND ALBUTEROL SULFATE SCH AMPULE: .5; 3 SOLUTION RESPIRATORY (INHALATION) at 20:57

## 2018-05-28 RX ADMIN — GUAIFENESIN SCH MG: 600 TABLET, EXTENDED RELEASE ORAL at 21:36

## 2018-05-28 RX ADMIN — Medication SCH ML: at 21:37

## 2018-05-28 RX ADMIN — AZITHROMYCIN SCH MLS/HR: 500 INJECTION, POWDER, LYOPHILIZED, FOR SOLUTION INTRAVENOUS at 12:33

## 2018-05-28 RX ADMIN — IPRATROPIUM BROMIDE AND ALBUTEROL SULFATE SCH AMPULE: .5; 3 SOLUTION RESPIRATORY (INHALATION) at 08:00

## 2018-05-28 RX ADMIN — TAMOXIFEN CITRATE SCH MG: 10 TABLET, FILM COATED ORAL at 10:16

## 2018-05-28 RX ADMIN — TAZOBACTAM SODIUM AND PIPERACILLIN SODIUM SCH MLS/HR: 375; 3 INJECTION, SOLUTION INTRAVENOUS at 17:40

## 2018-05-28 RX ADMIN — TAZOBACTAM SODIUM AND PIPERACILLIN SODIUM SCH MLS/HR: 375; 3 INJECTION, SOLUTION INTRAVENOUS at 23:28

## 2018-05-28 RX ADMIN — Medication SCH ML: at 10:17

## 2018-05-28 RX ADMIN — IPRATROPIUM BROMIDE AND ALBUTEROL SULFATE SCH AMPULE: .5; 3 SOLUTION RESPIRATORY (INHALATION) at 12:56

## 2018-05-28 NOTE — RADRPT
EXAM DATE:  5/28/2018 1:55 PM EDT

AGE/SEX:        48 years / Female



INDICATIONS:  Pneumonia.  History of breast cancer, rule out metastasis. 



CLINICAL DATA:  This is the patient's initial encounter. Patient reports that signs and symptoms have
 been present for 1 week and indicates a pain score of 0/10. 

                                                                          

MEDICAL/SURGICAL HISTORY:   Carcinoma, breast. None.



RADIATION DOSE:  9.30 CTDI (mGy)









COMPARISON:      No prior [place] exams available for comparison.



TECHNIQUE:  Multiple contiguous axial images were obtained through the chest during bolus infusion of
 75 ml Omnipaque 350 (iohexol)  nonionic water-soluble contrast as a single exam dose.   Images were 
obtained in suspended respiration using multiple row detector helical technique.  Using automated exp
osure control and adjustment of the mA and/or kV according to patient size, radiation dose was kept a
s low as reasonably achievable to obtain optimal diagnostic quality images.



FINDINGS: 

Lungs:  Multi lobar consolidation throughout the right upper, right lower and to lesser degree right 
middle and lingula. Air bronchograms. A few scattered subcentimeter nodules in the right upper lobe m
easuring 4 to 8 mm in size.

Mediastinum:  There is good visualization of the great vessels of the middle mediastinum.  No evidenc
e of mediastinal or hilar adenopathy/mass.

Pleurae:  No evidence of focal thickening or pleural effusion.

Axillae:  Unremarkable.

Bony Structures:  Unremarkable.

Miscellaneous:  The examination was extended to include the upper abdomen, and both adrenal glands ar
e normal in size and configuration.



CONCLUSION:

1.  Multilobar consolidation likely multilobar pneumonia greater in the right lung. Treatment and fol
low-up to resolution recommended.

2.  A few scattered subcentimeter nodules of uncertain significance but could be part of the same inf
ectious process. Follow-up CT chest in 3 months recommended for stability.



Electronically signed by: Shreyas Crespo MD  5/28/2018 2:01 PM EDT

## 2018-05-28 NOTE — HHI.PR
Subjective


Remarks


Patient reports feeling about the same as yesterday


Continues to have shortness of breath with minimal exertion


24-hour T-max 100.6





Objective


Vitals





Vital Signs








  Date Time  Temp Pulse Resp B/P (MAP) Pulse Ox O2 Delivery O2 Flow Rate FiO2


 


5/28/18 08:02     93   


 


5/28/18 08:00 98.1 101 17 112/56 (74) 94   


 


5/28/18 07:40      Room Air  


 


5/28/18 04:00 97.7 73 16 107/57 (74) 95   


 


5/28/18 00:00 100.6 99 20 114/57 (76) 94   


 


5/27/18 20:25     94   21


 


5/27/18 20:00 98.5 86 18 109/56 (73) 94   


 


5/27/18 20:00     94 Room Air  


 


5/27/18 17:00 99.7       


 


5/27/18 16:00 99.0 93 20 98/51 (67) 95   


 


5/27/18 14:00 101.7       


 


5/27/18 12:00 98.5 98 21 121/67 (85) 95   








Result Diagram:  


5/28/18 0530                                                                   

             5/27/18 0433





Imaging





Last Impressions








Chest X-Ray 5/26/18 1443 Signed





Impressions: 





 CONCLUSION: 





    Right lower lobe pneumonia.





  





 








Objective Remarks


GENERAL: This is a well-nourished, well-developed patient, in no apparent 

distress.


CARDIOVASCULAR: Regular rate and rhythm 


RESPIRATORY: diminished RLL with coarse crackles-more air movement noted than 

yesterday


GASTROINTESTINAL: Abdomen soft, non-tender, nondistended. Normal active bowel 

sounds


MUSCULOSKELETAL: Extremities without clubbing, cyanosis, or edema.


NEURO:  Alert & Oriented x4 to person, place, time, situation.  Moves all ext x4





A/P


Problem List:  


(1) Pneumonia


ICD Codes:  J18.9 - Pneumonia, unspecified organism


Status:  Acute


Plan:  Pneumonia


This a 47yo female patient with PMH of breast CA s/p resection 11/2017 and 

radiation therapy ending in early April now on tamoxifen.  Pt reports that she 

had a CXR last week that showed pneumonia and was place on azithromycin which 

was starting 3 days ago by her primary care physician.  Patient reports that 

she continues to have fevers 101 -102 while chills, SOB with minimal exertion 

and cough productive of yellow phlegm.  Denies any chest pain but does have 

right breast pain. Patient denies any n/v, abdominal pain, focal weakness or 

numbness.  Patient's radiation oncologist is Dr. Stinson.  





- CXR reveals right lower lobe pneumonia


- continue Zosyn


- repeat CXR (5/27) No significant change in the diffuse patchy infiltrate 

throughout the right lung compared to the prior study.  The findings would 

suggest right pneumonia.


- duonebs Q6H while awake and PRN


- repeat CBC in AM


-repeat CXR in AM


- IS


-CT chest/thorax with IV contrast pending


Repeat CBC and BMP in a.m.





DVT prophylaxis with SCDs





 Breast CA


 This a 47yo female patient with PMH of breast CA s/p resection 11/2017 and 

radiation therapy ending in early April now on tamoxifen.  Patient's radiation 

oncologist is Dr. Stinson.  





- continue patient's home tamoxifen





(2) Breast CA


ICD Codes:  C50.919 - Malignant neoplasm of unspecified site of unspecified 

female breast


Assessment and Plan


Patient examined.


Assessment and plan formulated with Gracie Costa PA-C.


I agree with the above.


CAP. Breast CA. r/o underlying mets to lung.


add atypical abx coverage


add mucinex. cont nebs.


ngtd on blood cx. inc spirometers.





Problem Qualifiers





(1) Pneumonia:  


Qualified Codes:  J18.1 - Lobar pneumonia, unspecified organism








Gracie Costa May 28, 2018 11:04


Leonardo Laureano MD May 28, 2018 21:32

## 2018-05-29 VITALS
TEMPERATURE: 100 F | SYSTOLIC BLOOD PRESSURE: 101 MMHG | RESPIRATION RATE: 17 BRPM | DIASTOLIC BLOOD PRESSURE: 59 MMHG | OXYGEN SATURATION: 93 % | HEART RATE: 86 BPM

## 2018-05-29 VITALS
SYSTOLIC BLOOD PRESSURE: 111 MMHG | DIASTOLIC BLOOD PRESSURE: 60 MMHG | RESPIRATION RATE: 18 BRPM | HEART RATE: 90 BPM | TEMPERATURE: 98.4 F | OXYGEN SATURATION: 96 %

## 2018-05-29 VITALS
SYSTOLIC BLOOD PRESSURE: 112 MMHG | HEART RATE: 91 BPM | DIASTOLIC BLOOD PRESSURE: 57 MMHG | OXYGEN SATURATION: 97 % | TEMPERATURE: 97.3 F | RESPIRATION RATE: 18 BRPM

## 2018-05-29 VITALS
TEMPERATURE: 98.6 F | OXYGEN SATURATION: 93 % | DIASTOLIC BLOOD PRESSURE: 57 MMHG | RESPIRATION RATE: 18 BRPM | SYSTOLIC BLOOD PRESSURE: 111 MMHG | HEART RATE: 87 BPM

## 2018-05-29 VITALS
DIASTOLIC BLOOD PRESSURE: 62 MMHG | SYSTOLIC BLOOD PRESSURE: 109 MMHG | OXYGEN SATURATION: 97 % | TEMPERATURE: 99 F | HEART RATE: 86 BPM | RESPIRATION RATE: 18 BRPM

## 2018-05-29 VITALS
RESPIRATION RATE: 18 BRPM | HEART RATE: 88 BPM | SYSTOLIC BLOOD PRESSURE: 109 MMHG | TEMPERATURE: 98.7 F | OXYGEN SATURATION: 94 % | DIASTOLIC BLOOD PRESSURE: 54 MMHG

## 2018-05-29 VITALS — TEMPERATURE: 100.4 F

## 2018-05-29 VITALS — OXYGEN SATURATION: 100 %

## 2018-05-29 LAB
BASOPHILS # BLD AUTO: 0.1 TH/MM3 (ref 0–0.2)
BASOPHILS NFR BLD: 0.5 % (ref 0–2)
EOSINOPHIL # BLD: 0.3 TH/MM3 (ref 0–0.4)
EOSINOPHIL NFR BLD: 2.6 % (ref 0–4)
ERYTHROCYTE [DISTWIDTH] IN BLOOD BY AUTOMATED COUNT: 13.9 % (ref 11.6–17.2)
HCT VFR BLD CALC: 29.4 % (ref 35–46)
HGB BLD-MCNC: 9.9 GM/DL (ref 11.6–15.3)
LYMPHOCYTES # BLD AUTO: 0.5 TH/MM3 (ref 1–4.8)
LYMPHOCYTES NFR BLD AUTO: 4 % (ref 9–44)
MCH RBC QN AUTO: 31.4 PG (ref 27–34)
MCHC RBC AUTO-ENTMCNC: 33.8 % (ref 32–36)
MCV RBC AUTO: 93 FL (ref 80–100)
MONOCYTE #: 0.9 TH/MM3 (ref 0–0.9)
MONOCYTES NFR BLD: 6.9 % (ref 0–8)
NEUTROPHILS # BLD AUTO: 10.9 TH/MM3 (ref 1.8–7.7)
NEUTROPHILS NFR BLD AUTO: 86 % (ref 16–70)
PLATELET # BLD: 494 TH/MM3 (ref 150–450)
PMV BLD AUTO: 7.6 FL (ref 7–11)
RBC # BLD AUTO: 3.16 MIL/MM3 (ref 4–5.3)
WBC # BLD AUTO: 12.7 TH/MM3 (ref 4–11)

## 2018-05-29 RX ADMIN — Medication SCH ML: at 08:37

## 2018-05-29 RX ADMIN — ACETYLCYSTEINE SCH ML: 200 SOLUTION ORAL; RESPIRATORY (INHALATION) at 13:09

## 2018-05-29 RX ADMIN — AZITHROMYCIN SCH MLS/HR: 500 INJECTION, POWDER, LYOPHILIZED, FOR SOLUTION INTRAVENOUS at 12:23

## 2018-05-29 RX ADMIN — IPRATROPIUM BROMIDE AND ALBUTEROL SULFATE SCH AMPULE: .5; 3 SOLUTION RESPIRATORY (INHALATION) at 20:16

## 2018-05-29 RX ADMIN — ACETYLCYSTEINE SCH ML: 200 SOLUTION ORAL; RESPIRATORY (INHALATION) at 20:16

## 2018-05-29 RX ADMIN — ACETAMINOPHEN PRN MG: 325 TABLET ORAL at 13:21

## 2018-05-29 RX ADMIN — GUAIFENESIN SCH MG: 600 TABLET, EXTENDED RELEASE ORAL at 19:56

## 2018-05-29 RX ADMIN — IPRATROPIUM BROMIDE AND ALBUTEROL SULFATE SCH AMPULE: .5; 3 SOLUTION RESPIRATORY (INHALATION) at 13:09

## 2018-05-29 RX ADMIN — TAZOBACTAM SODIUM AND PIPERACILLIN SODIUM SCH MLS/HR: 375; 3 INJECTION, SOLUTION INTRAVENOUS at 22:31

## 2018-05-29 RX ADMIN — TAZOBACTAM SODIUM AND PIPERACILLIN SODIUM SCH MLS/HR: 375; 3 INJECTION, SOLUTION INTRAVENOUS at 05:29

## 2018-05-29 RX ADMIN — TAZOBACTAM SODIUM AND PIPERACILLIN SODIUM SCH MLS/HR: 375; 3 INJECTION, SOLUTION INTRAVENOUS at 11:12

## 2018-05-29 RX ADMIN — Medication SCH ML: at 19:56

## 2018-05-29 RX ADMIN — GUAIFENESIN SCH MG: 600 TABLET, EXTENDED RELEASE ORAL at 08:35

## 2018-05-29 RX ADMIN — TAMOXIFEN CITRATE SCH MG: 10 TABLET, FILM COATED ORAL at 08:34

## 2018-05-29 RX ADMIN — IPRATROPIUM BROMIDE AND ALBUTEROL SULFATE SCH AMPULE: .5; 3 SOLUTION RESPIRATORY (INHALATION) at 08:27

## 2018-05-29 RX ADMIN — TAZOBACTAM SODIUM AND PIPERACILLIN SODIUM SCH MLS/HR: 375; 3 INJECTION, SOLUTION INTRAVENOUS at 16:53

## 2018-05-29 NOTE — MB
cc:

Luis Carlos Roche MD, Arjun D MD

****

 

 

DATE:

05/29/2018

 

 REQUESTING PHYSICIAN:

Dr. Laureano

 

REASON FOR CONSULTATION:

Evaluation for pneumonia.

 

HISTORY OF PRESENT ILLNESS:

Ms. Trammell is a pleasant 48-year-old female with history of CA of the 

breast.  She had a lumpectomy done and then she received 36 radiation 

treatments, now she is on tamoxifen.  The patient started having fever

about 2 weeks ago.  She took some Tylenol, did not get better, [facility] 

[facility] and [facility] started on Z-[place].  She continued 

to have fever and [facility] and was told that 

she had pneumonia.  She was given another antibiotic, did not get 

better.  Because of continued fever, she came to the hospital.  She 

had a CT scan of the chest done, which shows multilobar consolidation,

likely multilobar pneumonia in the right lung, a few scattered 

subcentimeter nodules of uncertain significance.

 

LABORATORY DATA:

Her CBC showed WBC count 12.7, hemoglobin 9.9, hematocrit 29.4, MCV 

93, platelet count 494.  Sodium 136, potassium 3.9, chloride 99, CO2 

of 26, BUN 6, creatinine 0.63.  INR 1.1.  Her blood cultures so far 

are negative.  Influenza antigen is negative.  Legionella and 

pneumococcal antigen are negative.

 

PAST MEDICAL HISTORY:

Significant for history of breast cancer status post lumpectomy, 

radiation treatment.  She is on tamoxifen.

 

CURRENT MEDICATIONS:

1.  Mucomyst nebulizer treatment,

2.  Mucinex

3.  Zithromax 500 mg a day.

4.  Tamoxifen 10 mg a day.

5.  Zosyn q. 6 hrs

6.  Albuterol and Atrovent nebulizer treatment.

 

ALLERGIES:

ALLERGIC TO SULFA AND LIDOCAINE.

 

SOCIAL HISTORY:

She is recently .  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

 No history of any smoking, drinks socially.  No drug abuse.

 

FAMILY HISTORY:

She has 3 daughters; has 3 brothers and 2 sisters, 1 brother has 

depression problem.

 

REVIEW OF SYSTEMS:.

Normally she is healthy.  No weight loss, no DVT or pulmonary 

embolism.  No seizure or stroke.

 

PHYSICAL EXAMINATION:

GENERAL:  Well-built, well-nourished female not in acute distress.

VITAL SIGNS:  Blood pressure 112/57, heart rate 91, respirations 18, 

temperature 97.4.

HEENT:  Pupils are equal and reactive to light.  Oral mucosa and nasal

mucosa normal.

NECK:  Supple.  JVP not raised.

CHEST:  Equal bilaterally.  She has bronchial breath sounds in the 

right lung.

CARDIOVASCULAR:  S1, S2 normal.

ABDOMEN:  Benign.

EXTREMITIES:  No edema.

 

IMPRESSION:

1.  Multilobar pneumonia.  Clinically, she is responding to treatment.

2.  Carcinoma of the breast.

3.  Mild leukocytosis.

 

PLAN:

We will maintain on antibiotics, Zosyn and Zithromax.  Monitor her 

cultures.  Encourage her to use Acapella to see if she can produce any

sputum.  She will need a repeat CT scan of the chest in 3-4 weeks to 

document complete clearing of the lung infiltrate.  Further 

recommendations will depend on the course in the hospital.

 

Thank you, Dr. Laureano, for this consult.

 

 

__________________________________

MD LESLIE Cain//baldemar

D: 05/29/2018, 07:14 PM

T: 05/29/2018, 07:36 PM

Visit #: [id number]

Job #: [id number]

TRAY

## 2018-05-29 NOTE — HHI.PR
Subjective


Remarks


pt with some sob with exertion.


mucinex starting to break up congestion.





Objective


Vitals


heart reg


lung crackles. egophany RLL


 few course left lung bs


abd s/nt


ext no edema


Vital Signs








  Date Time  Temp Pulse Resp B/P (MAP) Pulse Ox O2 Delivery O2 Flow Rate FiO2


 


5/29/18 08:00 98.6 87 18 111/57 (75) 93   


 


5/29/18 04:00 100.0 86 17 101/59 (73) 93   


 


5/29/18 00:01 98.4 90 18 111/60 (77) 96   


 


5/28/18 21:01     94   


 


5/28/18 20:00 99.4 90 18 106/57 (73) 95   


 


5/28/18 16:00 97.8 97 18 115/55 (75) 95   


 


5/28/18 12:00 98.5 93 18 112/54 (73) 95   








Result Diagram:  


5/29/18 0501                                                                   

             5/27/18 0433





Imaging





Last Impressions








Chest X-Ray 5/26/18 1443 Signed





Impressions: 





 CONCLUSION: 





    Right lower lobe pneumonia.





  





 











A/P


Problem List:  


(1) Pneumonia


ICD Codes:  J18.9 - Pneumonia, unspecified organism


Status:  Acute


Plan:  Pneumonia


This a 47yo female patient with PMH of breast CA s/p resection 11/2017 and 

radiation therapy ending in early April now on tamoxifen.  Pt reports that she 

had a CXR last week that showed pneumonia and was place on azithromycin which 

was starting 3 days ago by her primary care physician.  Patient reports that 

she continues to have fevers 101 -102 while chills, SOB with minimal exertion 

and cough productive of yellow phlegm.  Denies any chest pain but does have 

right breast pain. Patient denies any n/v, abdominal pain, focal weakness or 

numbness.  Patient's radiation oncologist is Dr. Stinson.  





- CXR reveals right lower lobe pneumonia


- CT chest 5/28:


1.  Multilobar consolidation likely multilobar pneumonia greater in the right 

lung. Treatment and follow-up to resolution recommended.


2.  A few scattered subcentimeter nodules of uncertain significance but could 

be part of the same infectious process. Follow-up CT chest in 3 months 

recommended for stability.





_ Pt is on zosyn and azithromycin. will likely d/c home on levaquin


-- continue nebs and add mucomyst....gs/cx sputum if productive


-- increase ambulation


-- cont incentive spirometer


-- She will need repeat chest imaging in next 3months. I discussed with pt/




-- We will establish her with a Pulmonologist. She was scheduled to see Dr Roche.





DVT prophylaxis with SCDs





 Breast CA


 This a 47yo female patient with PMH of breast CA s/p resection 11/2017 and 

radiation therapy ending in early April now on tamoxifen.  Patient's radiation 

oncologist is Dr. Stinson.  





- continue patient's home tamoxifen





(2) Breast CA


ICD Codes:  C50.919 - Malignant neoplasm of unspecified site of unspecified 

female breast





Problem Qualifiers





(1) Pneumonia:  


Qualified Codes:  J18.1 - Lobar pneumonia, unspecified organism








Leonardo Laureano MD May 29, 2018 10:14

## 2018-05-30 VITALS
RESPIRATION RATE: 18 BRPM | OXYGEN SATURATION: 93 % | DIASTOLIC BLOOD PRESSURE: 55 MMHG | HEART RATE: 99 BPM | TEMPERATURE: 98.5 F | SYSTOLIC BLOOD PRESSURE: 112 MMHG

## 2018-05-30 VITALS
OXYGEN SATURATION: 96 % | DIASTOLIC BLOOD PRESSURE: 58 MMHG | RESPIRATION RATE: 18 BRPM | SYSTOLIC BLOOD PRESSURE: 108 MMHG | HEART RATE: 90 BPM | TEMPERATURE: 99 F

## 2018-05-30 VITALS
SYSTOLIC BLOOD PRESSURE: 108 MMHG | DIASTOLIC BLOOD PRESSURE: 62 MMHG | OXYGEN SATURATION: 97 % | RESPIRATION RATE: 18 BRPM | TEMPERATURE: 99.4 F | HEART RATE: 94 BPM

## 2018-05-30 VITALS
TEMPERATURE: 99 F | OXYGEN SATURATION: 92 % | SYSTOLIC BLOOD PRESSURE: 109 MMHG | RESPIRATION RATE: 18 BRPM | HEART RATE: 91 BPM | DIASTOLIC BLOOD PRESSURE: 57 MMHG

## 2018-05-30 VITALS
TEMPERATURE: 99.1 F | SYSTOLIC BLOOD PRESSURE: 111 MMHG | HEART RATE: 94 BPM | RESPIRATION RATE: 17 BRPM | DIASTOLIC BLOOD PRESSURE: 56 MMHG | OXYGEN SATURATION: 94 %

## 2018-05-30 VITALS
RESPIRATION RATE: 18 BRPM | TEMPERATURE: 98.8 F | SYSTOLIC BLOOD PRESSURE: 105 MMHG | HEART RATE: 86 BPM | OXYGEN SATURATION: 95 % | DIASTOLIC BLOOD PRESSURE: 59 MMHG

## 2018-05-30 VITALS — OXYGEN SATURATION: 94 %

## 2018-05-30 RX ADMIN — GUAIFENESIN SCH MG: 600 TABLET, EXTENDED RELEASE ORAL at 19:44

## 2018-05-30 RX ADMIN — TAZOBACTAM SODIUM AND PIPERACILLIN SODIUM SCH MLS/HR: 375; 3 INJECTION, SOLUTION INTRAVENOUS at 22:12

## 2018-05-30 RX ADMIN — GUAIFENESIN SCH MG: 600 TABLET, EXTENDED RELEASE ORAL at 08:27

## 2018-05-30 RX ADMIN — TAZOBACTAM SODIUM AND PIPERACILLIN SODIUM SCH MLS/HR: 375; 3 INJECTION, SOLUTION INTRAVENOUS at 11:04

## 2018-05-30 RX ADMIN — Medication SCH ML: at 19:45

## 2018-05-30 RX ADMIN — Medication SCH ML: at 08:29

## 2018-05-30 RX ADMIN — ACETYLCYSTEINE SCH ML: 200 SOLUTION ORAL; RESPIRATORY (INHALATION) at 13:12

## 2018-05-30 RX ADMIN — IPRATROPIUM BROMIDE AND ALBUTEROL SULFATE SCH AMPULE: .5; 3 SOLUTION RESPIRATORY (INHALATION) at 07:56

## 2018-05-30 RX ADMIN — IPRATROPIUM BROMIDE AND ALBUTEROL SULFATE SCH AMPULE: .5; 3 SOLUTION RESPIRATORY (INHALATION) at 19:20

## 2018-05-30 RX ADMIN — TAZOBACTAM SODIUM AND PIPERACILLIN SODIUM SCH MLS/HR: 375; 3 INJECTION, SOLUTION INTRAVENOUS at 04:40

## 2018-05-30 RX ADMIN — ACETYLCYSTEINE SCH ML: 200 SOLUTION ORAL; RESPIRATORY (INHALATION) at 07:57

## 2018-05-30 RX ADMIN — TAMOXIFEN CITRATE SCH MG: 10 TABLET, FILM COATED ORAL at 08:27

## 2018-05-30 RX ADMIN — TAZOBACTAM SODIUM AND PIPERACILLIN SODIUM SCH MLS/HR: 375; 3 INJECTION, SOLUTION INTRAVENOUS at 15:59

## 2018-05-30 RX ADMIN — AZITHROMYCIN SCH MLS/HR: 500 INJECTION, POWDER, LYOPHILIZED, FOR SOLUTION INTRAVENOUS at 11:56

## 2018-05-30 RX ADMIN — ACETYLCYSTEINE SCH ML: 200 SOLUTION ORAL; RESPIRATORY (INHALATION) at 19:20

## 2018-05-30 RX ADMIN — IPRATROPIUM BROMIDE AND ALBUTEROL SULFATE SCH AMPULE: .5; 3 SOLUTION RESPIRATORY (INHALATION) at 13:12

## 2018-05-30 NOTE — PD.PN.STU
Subjective


Remarks


Ms. Trammell is feeling better today. Her temp was 99 this am.  She feels that 

the mucinex nebulizer is improving her breathing. Yesterday she walked around 

the hallways and felt short of breath. She plans to walk today as well. Last 

night she developed an itchy rash behind her left knee and on her med-upper 

back. She has been using topical Benadryl to help with the itching.





Objective


Vitals





Vital Signs








  Date Time  Temp Pulse Resp B/P (MAP) Pulse Ox O2 Delivery O2 Flow Rate FiO2


 


5/30/18 08:44      Room Air  


 


5/30/18 08:00 99.1 94 17 111/56 (74) 94   


 


5/30/18 04:10 99.0 91 18 109/57 (74) 92   


 


5/30/18 00:21 98.8 86 18 105/59 (74) 95   


 


5/29/18 20:21     100   


 


5/29/18 19:50 99.0 86 18 109/62 (78) 97   


 


5/29/18 16:00 97.3 91 18 112/57 (75) 97   


 


5/29/18 13:21 100.4       


 


5/29/18 12:00 98.7 88 18 109/54 (72) 94   














I/O      


 


 5/29/18 5/29/18 5/29/18 5/30/18 5/30/18 5/30/18





 07:00 15:00 23:00 07:00 15:00 23:00


 


Intake Total 700 ml   420 ml  


 


Balance 700 ml   420 ml  


 


      


 


Intake Oral 600 ml   420 ml  


 


IV Total 100 ml     


 


# Voids 2  6 3  


 


# Bowel Movements   2 2  








Result Diagram:  


5/29/18 0501                                                                   

             5/27/18 0433





Objective Remarks


NAD


Lung - clear equal breath sounds. No wheezing or rhonchi. No use of accessory 

muscles.


Skin - macular rash behind left knee. One small bump under left scapula that is 

red mostly likely from itching





A/P


Assessment and Plan


1.  Multilobar pneumonia


   Responding to TX


   Pending sputum cultures


   Continue breathing treatments, nebulized Mucinex, and incentive spirometry 


   Switch to PO Levaquin


   Tylenol PRN for fevers 


   Seen by pulm, will f/u in 3-4 weeks for repeat CT  


   Possible D/C today or tomorrow 





2.  Carcinoma of the breast.











Marian Rendon May 30, 2018 10:59

## 2018-05-30 NOTE — HHI.PR
Subjective


Remarks


48 YOWF with Bilat pn, ca breast


Feels better


No Fever


Minimal sp


No CP


No SOB





Objective


Vital Signs





Vital Signs








  Date Time  Temp Pulse Resp B/P (MAP) Pulse Ox O2 Delivery O2 Flow Rate FiO2


 


5/30/18 19:20     94   21


 


5/30/18 16:30 99.0 90 18 108/58 (75) 96   


 


5/30/18 16:00      Room Air  


 


5/30/18 11:13      Room Air  


 


5/30/18 11:07 99.4 94 18 108/62 (77) 97   


 


5/30/18 08:44      Room Air  


 


5/30/18 08:00 99.1 94 17 111/56 (74) 94   


 


5/30/18 04:10 99.0 91 18 109/57 (74) 92   


 


5/30/18 00:21 98.8 86 18 105/59 (74) 95   














I/O      


 


 5/29/18 5/29/18 5/29/18 5/30/18 5/30/18 5/30/18





 07:00 15:00 23:00 07:00 15:00 23:00


 


Intake Total 700 ml   420 ml 300 ml 2440 ml


 


Balance 700 ml   420 ml 300 ml 2440 ml


 


      


 


Intake Oral 600 ml   420 ml  2440 ml


 


IV Total 100 ml    300 ml 


 


# Voids 2  6 3  8


 


# Bowel Movements   2 2  1








Result Diagram:  


5/29/18 0501                                                                   

             5/27/18 0433





Objective Remarks


GENERAL: WBWN WF,NAD


SKIN: Warm and dry.


HEAD: Normocephalic.


EYES: No scleral icterus. No injection or drainage. 


NECK: Supple, trachea midline. No JVD or lymphadenopathy.


CARDIOVASCULAR: Regular rate and rhythm without murmurs, gallops, or rubs. 


RESPIRATORY: Breath sounds equal bilaterally. No accessory muscle use.


Bronchial BS right lung


GASTROINTESTINAL: Abdomen soft, non-tender, nondistended. 


MUSCULOSKELETAL: No cyanosis, or edema. 


BACK: Nontender without obvious deformity. No CVA tenderness.








A/P


Assessment and Plan


IMPRESSION:


1.  Multilobar pneumonia.  Clinically, she is responding to treatment.


2.  Carcinoma of the breast.


3.  Mild leukocytosis.


 





PLAN:





Cont Zosyn and zithro


Check Cultures


Acapella q 1hr


Rpt CXR in Luis Carlos Remy MD May 30, 2018 20:30

## 2018-05-30 NOTE — HHI.PR
Subjective


Remarks


doing better





Objective


Vitals


heart reg


lung few crackles right base


abd s/nt


ext no edema


Vital Signs








  Date Time  Temp Pulse Resp B/P (MAP) Pulse Ox O2 Delivery O2 Flow Rate FiO2


 


5/30/18 11:13      Room Air  


 


5/30/18 11:07 99.4 94 18 108/62 (77) 97   


 


5/30/18 08:44      Room Air  


 


5/30/18 08:00 99.1 94 17 111/56 (74) 94   


 


5/30/18 04:10 99.0 91 18 109/57 (74) 92   


 


5/30/18 00:21 98.8 86 18 105/59 (74) 95   


 


5/29/18 20:21     100   


 


5/29/18 19:50 99.0 86 18 109/62 (78) 97   


 


5/29/18 16:00 97.3 91 18 112/57 (75) 97   








Result Diagram:  


5/29/18 0501                                                                   

             5/27/18 0433





Imaging





Last Impressions








Chest X-Ray 5/26/18 1443 Signed





Impressions: 





 CONCLUSION: 





    Right lower lobe pneumonia.





  





 











A/P


Problem List:  


(1) Pneumonia


ICD Codes:  J18.9 - Pneumonia, unspecified organism


Status:  Acute


Plan:  Pneumonia


This a 47yo female patient with PMH of breast CA s/p resection 11/2017 and 

radiation therapy ending in early April now on tamoxifen.  Pt reports that she 

had a CXR last week that showed pneumonia and was place on azithromycin which 

was starting 3 days ago by her primary care physician.  Patient reports that 

she continues to have fevers 101 -102 while chills, SOB with minimal exertion 

and cough productive of yellow phlegm.  Denies any chest pain but does have 

right breast pain. Patient denies any n/v, abdominal pain, focal weakness or 

numbness.  Patient's radiation oncologist is Dr. Stinson.  





- CXR reveals right lower lobe pneumonia


- CT chest 5/28:


1.  Multilobar consolidation likely multilobar pneumonia greater in the right 

lung. Treatment and follow-up to resolution recommended.


2.  A few scattered subcentimeter nodules of uncertain significance but could 

be part of the same infectious process. Follow-up CT chest in 3 months 

recommended for stability.





_ Pt is on zosyn and azithromycin. will likely d/c home on levaquin


-- continue nebs and add mucomyst....gs/cx sputum


-- increase ambulation


-- cont incentive spirometer


-- She will need repeat chest imaging in next 3months. I discussed with pt/




-- We will establish her with a Pulmonologist. She was scheduled to see Dr Roche.





plan for d/c tomorrow on po abx and close f/u





DVT prophylaxis with SCDs





 Breast CA


 This a 47yo female patient with PMH of breast CA s/p resection 11/2017 and 

radiation therapy ending in early April now on tamoxifen.  Patient's radiation 

oncologist is Dr. Stinson.  





- continue patient's home tamoxifen





(2) Breast CA


ICD Codes:  C50.919 - Malignant neoplasm of unspecified site of unspecified 

female breast





Problem Qualifiers





(1) Pneumonia:  


Qualified Codes:  J18.1 - Lobar pneumonia, unspecified organism








Leonardo Laureano MD May 30, 2018 14:59

## 2018-05-31 VITALS
HEART RATE: 95 BPM | TEMPERATURE: 98.4 F | SYSTOLIC BLOOD PRESSURE: 107 MMHG | OXYGEN SATURATION: 95 % | DIASTOLIC BLOOD PRESSURE: 55 MMHG | RESPIRATION RATE: 18 BRPM

## 2018-05-31 VITALS
SYSTOLIC BLOOD PRESSURE: 99 MMHG | TEMPERATURE: 98.2 F | RESPIRATION RATE: 18 BRPM | OXYGEN SATURATION: 95 % | HEART RATE: 87 BPM | DIASTOLIC BLOOD PRESSURE: 59 MMHG

## 2018-05-31 VITALS — OXYGEN SATURATION: 93 %

## 2018-05-31 VITALS
HEART RATE: 93 BPM | DIASTOLIC BLOOD PRESSURE: 57 MMHG | RESPIRATION RATE: 16 BRPM | OXYGEN SATURATION: 96 % | TEMPERATURE: 98.3 F | SYSTOLIC BLOOD PRESSURE: 119 MMHG

## 2018-05-31 VITALS
OXYGEN SATURATION: 94 % | SYSTOLIC BLOOD PRESSURE: 125 MMHG | RESPIRATION RATE: 17 BRPM | DIASTOLIC BLOOD PRESSURE: 62 MMHG | HEART RATE: 99 BPM | TEMPERATURE: 97.9 F

## 2018-05-31 VITALS
HEART RATE: 93 BPM | RESPIRATION RATE: 18 BRPM | DIASTOLIC BLOOD PRESSURE: 60 MMHG | TEMPERATURE: 99.1 F | SYSTOLIC BLOOD PRESSURE: 108 MMHG | OXYGEN SATURATION: 90 %

## 2018-05-31 VITALS
OXYGEN SATURATION: 99 % | SYSTOLIC BLOOD PRESSURE: 119 MMHG | HEART RATE: 92 BPM | TEMPERATURE: 98.3 F | DIASTOLIC BLOOD PRESSURE: 57 MMHG | RESPIRATION RATE: 16 BRPM

## 2018-05-31 VITALS
TEMPERATURE: 98.3 F | RESPIRATION RATE: 16 BRPM | HEART RATE: 91 BPM | SYSTOLIC BLOOD PRESSURE: 116 MMHG | OXYGEN SATURATION: 95 % | DIASTOLIC BLOOD PRESSURE: 57 MMHG

## 2018-05-31 VITALS — OXYGEN SATURATION: 99 %

## 2018-05-31 RX ADMIN — IPRATROPIUM BROMIDE AND ALBUTEROL SULFATE PRN AMPULE: .5; 3 SOLUTION RESPIRATORY (INHALATION) at 16:52

## 2018-05-31 RX ADMIN — ACETYLCYSTEINE SCH ML: 200 SOLUTION ORAL; RESPIRATORY (INHALATION) at 20:45

## 2018-05-31 RX ADMIN — IPRATROPIUM BROMIDE AND ALBUTEROL SULFATE PRN AMPULE: .5; 3 SOLUTION RESPIRATORY (INHALATION) at 14:27

## 2018-05-31 RX ADMIN — TAZOBACTAM SODIUM AND PIPERACILLIN SODIUM SCH MLS/HR: 375; 3 INJECTION, SOLUTION INTRAVENOUS at 05:13

## 2018-05-31 RX ADMIN — TAMOXIFEN CITRATE SCH MG: 10 TABLET, FILM COATED ORAL at 07:41

## 2018-05-31 RX ADMIN — GUAIFENESIN SCH MG: 600 TABLET, EXTENDED RELEASE ORAL at 19:35

## 2018-05-31 RX ADMIN — Medication SCH ML: at 19:35

## 2018-05-31 RX ADMIN — ACETYLCYSTEINE SCH ML: 200 SOLUTION ORAL; RESPIRATORY (INHALATION) at 08:00

## 2018-05-31 RX ADMIN — IPRATROPIUM BROMIDE AND ALBUTEROL SULFATE PRN AMPULE: .5; 3 SOLUTION RESPIRATORY (INHALATION) at 08:36

## 2018-05-31 RX ADMIN — ACETYLCYSTEINE SCH ML: 200 SOLUTION ORAL; RESPIRATORY (INHALATION) at 14:00

## 2018-05-31 RX ADMIN — Medication SCH ML: at 07:42

## 2018-05-31 RX ADMIN — GUAIFENESIN SCH MG: 600 TABLET, EXTENDED RELEASE ORAL at 07:42

## 2018-05-31 RX ADMIN — IPRATROPIUM BROMIDE AND ALBUTEROL SULFATE PRN AMPULE: .5; 3 SOLUTION RESPIRATORY (INHALATION) at 20:45

## 2018-05-31 NOTE — RADRPT
EXAM DATE:  5/31/2018 6:57 AM EDT

AGE/SEX:        48 years / Female



INDICATIONS:  Cough, fever, short of breath, burning in middle of chest



CLINICAL DATA:  This is the patient's subsequent encounter. Patient reports that signs and symptoms h
ave been present for 4 - 6 days and indicates a pain score of 0/10. 

                                                                          

MEDICAL/SURGICAL HISTORY:       Carcinoma, breast. pneumonia  . lumpectomy



[place]      [facility] [place]  .



FINDINGS:  

There is worsening right lung consolidation with extensive right upper lung. Left lung infiltrate is 
present in mid lung field not present previously.



CONCLUSION: 

Worsening right lung consolidation and interval development of left lung infiltrate.



Electronically signed by: KIRILL Ndiaye MD  5/31/2018 7:01 AM EDT

## 2018-05-31 NOTE — HHI.PR
Subjective


Remarks


48 YOWF with Bilat pn, ca breast


Feels better


No Fever


Minimal sp


No CP


No SOB


CXR slight worsening of lung infilt


walks 10 rounds in the hallway





Objective


Vital Signs





Vital Signs








  Date Time  Temp Pulse Resp B/P (MAP) Pulse Ox O2 Delivery O2 Flow Rate FiO2


 


5/31/18 16:21     94 Room Air  


 


5/31/18 16:21 97.9 99 17 125/62 (83) 94   


 


5/31/18 12:00 98.3 93 16 119/57 (77) 96   


 


5/31/18 11:36      Room Air  


 


5/31/18 11:34 98.3 92 16 119/57 (77) 99   


 


5/31/18 08:39     99   21


 


5/31/18 08:00 98.3 91 16 116/57 (76) 95   


 


5/31/18 07:44      Room Air  


 


5/31/18 04:15 99.1 93 18 108/60 (76) 90   


 


5/31/18 00:20 98.2 87 18 99/59 (72) 95   














I/O      


 


 5/30/18 5/30/18 5/30/18 5/31/18 5/31/18 5/31/18





 07:00 15:00 23:00 07:00 15:00 23:00


 


Intake Total 420 ml 300 ml 2490 ml 700 ml  


 


Balance 420 ml 300 ml 2490 ml 700 ml  


 


      


 


Intake Oral 420 ml  2440 ml 650 ml  


 


IV Total  300 ml 50 ml 50 ml  


 


# Voids 3  8 3  


 


# Bowel Movements 2  1   








Result Diagram:  


5/29/18 0501                                                                   

             5/27/18 0433





Objective Remarks


GENERAL: WBWN WF,NAD


SKIN: Warm and dry.


HEAD: Normocephalic.


EYES: No scleral icterus. No injection or drainage. 


NECK: Supple, trachea midline. No JVD or lymphadenopathy.


CARDIOVASCULAR: Regular rate and rhythm without murmurs, gallops, or rubs. 


RESPIRATORY: Breath sounds equal bilaterally. No accessory muscle use.


Bronchial BS right lung


GASTROINTESTINAL: Abdomen soft, non-tender, nondistended. 


MUSCULOSKELETAL: No cyanosis, or edema. 


BACK: Nontender without obvious deformity. No CVA tenderness.








A/P


Assessment and Plan


IMPRESSION:


1.  Multilobar pneumonia.  Clinically, she is responding to treatment.


2.  Carcinoma of the breast.


3.  Mild leukocytosis.


 





PLAN:


PO Levaquin


Acapella q 1hr


Incentive spirometry


Stable on RA


DC plans for home











Luis Carlos Roche MD May 31, 2018 19:56

## 2018-05-31 NOTE — HHI.PR
Subjective


Remarks


hoping to go home





Objective


Vitals


heart reg


lung crackles right


abd s/nt


ext no edema


Vital Signs








  Date Time  Temp Pulse Resp B/P (MAP) Pulse Ox O2 Delivery O2 Flow Rate FiO2


 


5/31/18 11:36      Room Air  


 


5/31/18 11:34 98.3 92 16 119/57 (77) 99   


 


5/31/18 08:39     99   21


 


5/31/18 08:00 98.3 91 16 116/57 (76) 95   


 


5/31/18 07:44      Room Air  


 


5/31/18 04:15 99.1 93 18 108/60 (76) 90   


 


5/31/18 00:20 98.2 87 18 99/59 (72) 95   


 


5/30/18 19:26 98.5 99 18 112/55 (74) 93   


 


5/30/18 19:20     94   21


 


5/30/18 16:30 99.0 90 18 108/58 (75) 96   


 


5/30/18 16:00      Room Air  








Result Diagram:  


5/29/18 0501                                                                   

             5/27/18 0433





Imaging





Last Impressions








Chest X-Ray 5/26/18 1443 Signed





Impressions: 





 CONCLUSION: 





    Right lower lobe pneumonia.





  





 











A/P


Problem List:  


(1) Pneumonia


ICD Codes:  J18.9 - Pneumonia, unspecified organism


Status:  Acute


Plan:  Pneumonia


This a 47yo female patient with PMH of breast CA s/p resection 11/2017 and 

radiation therapy ending in early April now on tamoxifen.  Pt reports that she 

had a CXR last week that showed pneumonia and was place on azithromycin which 

was starting 3 days ago by her primary care physician.  Patient reports that 

she continues to have fevers 101 -102 while chills, SOB with minimal exertion 

and cough productive of yellow phlegm.  Denies any chest pain but does have 

right breast pain. Patient denies any n/v, abdominal pain, focal weakness or 

numbness.  Patient's radiation oncologist is Dr. Stinson.  





- CXR reveals right lower lobe pneumonia


- CT chest 5/28:


1.  Multilobar consolidation likely multilobar pneumonia greater in the right 

lung. Treatment and follow-up to resolution recommended.


2.  A few scattered subcentimeter nodules of uncertain significance but could 

be part of the same infectious process. Follow-up CT chest in 3 months 

recommended for stability.





_ Pt is on zosyn and azithromycin. will likely d/c home on levaquin


-- continue nebs and add mucomyst....gs/cx sputum


-- increase ambulation


-- cont incentive spirometer


-- She will need repeat chest imaging in next 3months. I discussed with pt/




-- We will establish her with a Pulmonologist. She was scheduled to see Dr Roche.





convert to po abx today. cxr and consolidation noted. discuss with Dr Kaley Dowd 

when ok with dr Roche.





DVT prophylaxis with SCDs





 Breast CA


 This a 47yo female patient with PMH of breast CA s/p resection 11/2017 and 

radiation therapy ending in early April now on tamoxifen.  Patient's radiation 

oncologist is Dr. Stinson.  





- continue patient's home tamoxifen





(2) Breast CA


ICD Codes:  C50.919 - Malignant neoplasm of unspecified site of unspecified 

female breast





Problem Qualifiers





(1) Pneumonia:  


Qualified Codes:  J18.1 - Lobar pneumonia, unspecified organism








Leonardo Laureano MD May 31, 2018 12:58

## 2018-06-01 VITALS
TEMPERATURE: 98.3 F | RESPIRATION RATE: 16 BRPM | OXYGEN SATURATION: 95 % | HEART RATE: 87 BPM | SYSTOLIC BLOOD PRESSURE: 93 MMHG | DIASTOLIC BLOOD PRESSURE: 53 MMHG

## 2018-06-01 VITALS
RESPIRATION RATE: 18 BRPM | TEMPERATURE: 99.3 F | OXYGEN SATURATION: 94 % | DIASTOLIC BLOOD PRESSURE: 55 MMHG | HEART RATE: 91 BPM | SYSTOLIC BLOOD PRESSURE: 107 MMHG

## 2018-06-01 VITALS
SYSTOLIC BLOOD PRESSURE: 107 MMHG | DIASTOLIC BLOOD PRESSURE: 56 MMHG | OXYGEN SATURATION: 92 % | HEART RATE: 93 BPM | TEMPERATURE: 98.8 F | RESPIRATION RATE: 18 BRPM

## 2018-06-01 VITALS — OXYGEN SATURATION: 99 %

## 2018-06-01 VITALS — TEMPERATURE: 99.7 F

## 2018-06-01 RX ADMIN — IPRATROPIUM BROMIDE AND ALBUTEROL SULFATE PRN AMPULE: .5; 3 SOLUTION RESPIRATORY (INHALATION) at 09:23

## 2018-06-01 RX ADMIN — TAMOXIFEN CITRATE SCH MG: 10 TABLET, FILM COATED ORAL at 08:46

## 2018-06-01 RX ADMIN — GUAIFENESIN SCH MG: 600 TABLET, EXTENDED RELEASE ORAL at 08:46

## 2018-06-01 RX ADMIN — ACETAMINOPHEN PRN MG: 325 TABLET ORAL at 05:04

## 2018-06-01 RX ADMIN — Medication SCH ML: at 08:47

## 2018-06-01 RX ADMIN — IPRATROPIUM BROMIDE AND ALBUTEROL SULFATE PRN AMPULE: .5; 3 SOLUTION RESPIRATORY (INHALATION) at 05:14

## 2018-06-01 RX ADMIN — ACETYLCYSTEINE SCH ML: 200 SOLUTION ORAL; RESPIRATORY (INHALATION) at 09:23

## 2018-06-01 NOTE — HHI.DS
Discharge Summary


Admission Date


May 26, 2018 at 16:54


Discharge Date:  Jun 1, 2018


Admitting Diagnosis





Right lower pneumonia





(1) Pneumonia


Diagnosis:  Principal


ICD Codes:  J18.9 - Pneumonia, unspecified organism


Status:  Acute


(2) Breast CA


Diagnosis:  Secondary


ICD Codes:  C50.919 - Malignant neoplasm of unspecified site of unspecified 

female breast


Brief History


This a 47yo female patient with PMH of breast CA s/p resection 11/2017 and 

radiation therapy ending in early April now on tamoxifen.  Pt reports that she 

had a CXR last week that showed pneumonia and was place on azithromycin which 

was starting 3 days ago by her primary care physician.  Patient reports that 

she continues to have fevers 101 -102 with chills, SOB with minimal exertion 

and cough productive of yellow phlegm.  Denies any chest pain but does have 

right breast pain. Patient denies any n/v, abdominal pain, focal weakness or 

numbness.  Patient's radiation oncologist is Dr. Stinson.  





CXR reveals right lower lobe pneumonia


CBC/BMP:  


5/29/18 0501





Hospital Course


A/P


Problem List:  


(1) Pneumonia


This a 47yo female patient with PMH of breast CA s/p resection 11/2017 and 

radiation therapy ending in early April now on tamoxifen.  Pt reports that she 

had a CXR last week that showed pneumonia and was place on azithromycin which 

was starting 3 days ago by her primary care physician.  Patient reports that 

she continues to have fevers 101 -102 while chills, SOB with minimal exertion 

and cough productive of yellow phlegm.  Denies any chest pain but does have 

right breast pain. Patient denies any n/v, abdominal pain, focal weakness or 

numbness.  Patient's radiation oncologist is Dr. Stinson.  





- CXR reveals right lower lobe pneumonia


- CT chest 5/28:


1.  Multilobar consolidation likely multilobar pneumonia greater in the right 

lung. Treatment and follow-up to resolution recommended.


2.  A few scattered subcentimeter nodules of uncertain significance but could 

be part of the same infectious process. Follow-up CT chest in 3 months 

recommended for stability.





_ Pt was on zosyn and azithromycin. 


-- continue nebs and add mucomyst....


-- increased ambulation


-- cont incentive spirometer


-- She will need repeat chest imaging in next 3months. I discussed with pt/




--establish her with a Pulmonologist. She was scheduled to see Dr Roche.





continue levaquin/nebs/mucinex at home. f/u next week with dr Roche. repeat 

outpt CT chest.





 Breast CA


 This a 47yo female patient with PMH of breast CA s/p resection 11/2017 and 

radiation therapy ending in early April now on tamoxifen.  Patient's radiation 

oncologist is Dr. Stinson.  





- continue patient's home tamoxifen





(2) Breast CA


ICD Codes:  C50.919 - Malignant neoplasm of unspecified site of unspecified 

female breast


Pt Condition on Discharge:  Stable


Discharge Disposition:  Discharge Home


Discharge Instructions


DIET: Follow Instructions for:  As Tolerated, No Restrictions


Activities you can perform:  Regular-No Restrictions


Follow up Referrals:  


Pulmonology - 06/08/18 with Luis Carlos Roche MD





New Medications:  


Albuterol Neb (Albuterol Neb) 2.5 Mg/3 Ml Neb


2.5 MG NEB Q6HR for pneumonia for 30 Days, NEBULE 0 Refills





Ipratropium Neb (Ipratropium Neb) 0.5 Mg/2.5 Ml Amp


0.5 MG NEB Q6HR NEB for pneumonia for 30 Days, NEBULE 0 Refills





Nebulizer/Adult Mask (Nebulizer/Adult Mask) 1 Kit Kit


KIT .XX AS DIRECTED for Breathing Treatment, #1 0 Refills





Levofloxacin (Levaquin) 500 Mg Tablet


500 MG PO DAILY for pneumonia, #8 TAB





[guaiFENesin ER] () 600 MG TABCR


1200 MG PO BID for 7 Days





 


Continued Medications:  


Tamoxifen (Tamoxifen) 10 Mg Tab


10 MG PO DAILY for Chemotherapy Management, #60 TAB 0 Refills

















Leonardo Laureano MD Jun 1, 2018 10:42

## 2018-06-01 NOTE — HHI.PR
Subjective


Remarks


48 YOWF with Bilat pn, ca breast


Feels better


No Fever


Minimal sp


No CP


No SOB


Ambulating in hallway





Objective


Vital Signs





Vital Signs








  Date Time  Temp Pulse Resp B/P (MAP) Pulse Ox O2 Delivery O2 Flow Rate FiO2


 


6/1/18 09:28     99   21


 


6/1/18 08:00 98.8 93 18 107/56 (73) 92   


 


6/1/18 04:40 99.7       


 


6/1/18 04:00 99.3 91 18 107/55 (72) 94   


 


6/1/18 00:00 98.3 87 16 93/53 (66) 95   


 


5/31/18 20:47     93   21


 


5/31/18 20:00 98.4 95 18 107/55 (72) 95   


 


5/31/18 16:21     94 Room Air  


 


5/31/18 16:21 97.9 99 17 125/62 (83) 94   


 


5/31/18 12:00 98.3 93 16 119/57 (77) 96   


 


5/31/18 11:36      Room Air  


 


5/31/18 11:34 98.3 92 16 119/57 (77) 99   














I/O      


 


 5/31/18 5/31/18 5/31/18 6/1/18 6/1/18 6/1/18





 06:59 14:59 22:59 06:59 14:59 22:59


 


Intake Total 700 ml   960 ml  


 


Balance 700 ml   960 ml  


 


      


 


Intake Oral 650 ml   960 ml  


 


IV Total 50 ml     


 


# Voids 3   4  


 


# Bowel Movements    0  








Result Diagram:  


5/29/18 0501





Objective Remarks


GENERAL: WBWN WF,NAD


SKIN: Warm and dry.


HEAD: Normocephalic.


EYES: No scleral icterus. No injection or drainage. 


NECK: Supple, trachea midline. No JVD or lymphadenopathy.


CARDIOVASCULAR: Regular rate and rhythm without murmurs, gallops, or rubs. 


RESPIRATORY: Breath sounds equal bilaterally. No accessory muscle use.


Bronchial BS right lung


GASTROINTESTINAL: Abdomen soft, non-tender, nondistended. 


MUSCULOSKELETAL: No cyanosis, or edema. 


BACK: Nontender without obvious deformity. No CVA tenderness.








A/P


Assessment and Plan


IMPRESSION:


1.  Multilobar pneumonia.  Clinically, she is responding to treatment.


2.  Carcinoma of the breast.


3.  Mild leukocytosis.


 





PLAN:


PO Levaquin


Acapella q 1hr


Incentive spirometry


Stable on RA


DC plans for home


CXR on 6/07


FU in office 6/8


Call or come to ER if SOB or Fever











Luis Carlos Roche MD Jun 1, 2018 11:12

## 2018-06-01 NOTE — HHI.DCPOC
Discharge Care Plan


Diagnosis:  


(1) Pneumonia


Goals to Promote Your Health


* To prevent worsening of your condition and complications


* To maintain your health at the optimal level


Directions to Meet Your Goals


*** Take your medications as prescribed


*** Follow your dietary instruction


*** Follow activity as directed








*** Keep your appointments as scheduled


*** Take your immunizations and boosters as scheduled


*** If your symptoms worsen call your PCP, if no PCP go to Urgent Care Center 

or Emergency Room***


*** Smoking is Dangerous to Your Health. Avoid second hand smoke***


***Call the 24-hour hour crisis hotline for domestic abuse at 1-653.971.5960***











Leonardo Laureano MD Jun 1, 2018 10:34